# Patient Record
Sex: FEMALE | Race: WHITE | NOT HISPANIC OR LATINO | Employment: UNEMPLOYED | URBAN - METROPOLITAN AREA
[De-identification: names, ages, dates, MRNs, and addresses within clinical notes are randomized per-mention and may not be internally consistent; named-entity substitution may affect disease eponyms.]

---

## 2019-02-21 RX ORDER — MECOBAL/LEVOMEFOLAT CA/B6 PHOS 2-3-35 MG
TABLET ORAL
Qty: 30 TABLET | Refills: 0 | Status: SHIPPED | OUTPATIENT
Start: 2019-02-21 | End: 2019-03-21 | Stop reason: SDUPTHER

## 2019-02-26 RX ORDER — GABAPENTIN 300 MG/1
CAPSULE ORAL
Qty: 90 CAPSULE | Refills: 0 | Status: SHIPPED | OUTPATIENT
Start: 2019-02-26 | End: 2019-03-31 | Stop reason: SDUPTHER

## 2019-02-28 ENCOUNTER — OFFICE VISIT (OUTPATIENT)
Dept: RHEUMATOLOGY | Facility: CLINIC | Age: 74
End: 2019-02-28
Payer: MEDICARE

## 2019-02-28 VITALS
HEIGHT: 62 IN | RESPIRATION RATE: 16 BRPM | BODY MASS INDEX: 43.98 KG/M2 | DIASTOLIC BLOOD PRESSURE: 70 MMHG | TEMPERATURE: 98 F | WEIGHT: 239 LBS | HEART RATE: 76 BPM | SYSTOLIC BLOOD PRESSURE: 130 MMHG

## 2019-02-28 DIAGNOSIS — E11.42 DIABETIC PERIPHERAL NEUROPATHY: ICD-10-CM

## 2019-02-28 DIAGNOSIS — R94.4 ABNORMAL CREATININE CLEARANCE GLOMERULAR FILTRATION: ICD-10-CM

## 2019-02-28 DIAGNOSIS — M06.9 RHEUMATOID ARTHRITIS, INVOLVING UNSPECIFIED SITE, UNSPECIFIED RHEUMATOID FACTOR PRESENCE: Primary | ICD-10-CM

## 2019-02-28 DIAGNOSIS — M35.00 SJOGREN'S SYNDROME, WITH UNSPECIFIED ORGAN INVOLVEMENT: ICD-10-CM

## 2019-02-28 DIAGNOSIS — M54.17 LUMBOSACRAL RADICULOPATHY: ICD-10-CM

## 2019-02-28 DIAGNOSIS — M17.0 PRIMARY OSTEOARTHRITIS OF BOTH KNEES: ICD-10-CM

## 2019-02-28 PROBLEM — E11.9 TYPE 2 DIABETES MELLITUS: Status: ACTIVE | Noted: 2019-02-28

## 2019-02-28 PROBLEM — M70.70 BURSITIS OF HIP: Status: ACTIVE | Noted: 2019-02-28

## 2019-02-28 PROBLEM — M19.079 OSTEOARTHRITIS OF ANKLE: Status: ACTIVE | Noted: 2019-02-28

## 2019-02-28 PROBLEM — M17.9 OSTEOARTHRITIS OF KNEE: Status: ACTIVE | Noted: 2019-02-28

## 2019-02-28 PROCEDURE — 99214 PR OFFICE/OUTPT VISIT, EST, LEVL IV, 30-39 MIN: ICD-10-PCS | Mod: S$GLB,,, | Performed by: INTERNAL MEDICINE

## 2019-02-28 PROCEDURE — 99214 OFFICE O/P EST MOD 30 MIN: CPT | Mod: S$GLB,,, | Performed by: INTERNAL MEDICINE

## 2019-02-28 RX ORDER — PREDNISONE 10 MG/1
TABLET ORAL
COMMUNITY
End: 2019-09-09 | Stop reason: SDUPTHER

## 2019-02-28 RX ORDER — HYDROCODONE BITARTRATE AND ACETAMINOPHEN 7.5; 325 MG/1; MG/1
TABLET ORAL
COMMUNITY
End: 2024-02-21

## 2019-02-28 RX ORDER — PEN NEEDLE, DIABETIC 31 GX5/16"
NEEDLE, DISPOSABLE MISCELLANEOUS
Refills: 1 | COMMUNITY
Start: 2019-02-25

## 2019-02-28 RX ORDER — TEMAZEPAM 30 MG/1
CAPSULE ORAL
COMMUNITY
End: 2019-05-30 | Stop reason: SDUPTHER

## 2019-02-28 RX ORDER — PANTOPRAZOLE SODIUM 40 MG/1
TABLET, DELAYED RELEASE ORAL
COMMUNITY
Start: 2019-02-27 | End: 2023-11-15

## 2019-02-28 RX ORDER — AMLODIPINE BESYLATE 5 MG/1
TABLET ORAL
COMMUNITY
End: 2023-11-15

## 2019-02-28 RX ORDER — DULOXETIN HYDROCHLORIDE 60 MG/1
CAPSULE, DELAYED RELEASE ORAL
COMMUNITY
End: 2023-11-15

## 2019-02-28 RX ORDER — INSULIN GLARGINE 100 [IU]/ML
INJECTION, SOLUTION SUBCUTANEOUS
COMMUNITY

## 2019-02-28 RX ORDER — METHOTREXATE 2.5 MG/1
TABLET ORAL
Refills: 3 | COMMUNITY
Start: 2019-01-25 | End: 2019-12-10 | Stop reason: SDUPTHER

## 2019-02-28 RX ORDER — LEFLUNOMIDE 10 MG/1
TABLET ORAL
Refills: 4 | COMMUNITY
Start: 2019-01-31 | End: 2019-08-02 | Stop reason: SDUPTHER

## 2019-02-28 RX ORDER — LINAGLIPTIN 5 MG/1
TABLET, FILM COATED ORAL
Refills: 1 | COMMUNITY
Start: 2019-01-17

## 2019-02-28 RX ORDER — FOLIC ACID 1 MG/1
TABLET ORAL
Refills: 0 | COMMUNITY
Start: 2019-02-24 | End: 2019-05-14 | Stop reason: SDUPTHER

## 2019-02-28 RX ORDER — LOSARTAN POTASSIUM 100 MG/1
TABLET ORAL
Refills: 1 | COMMUNITY
Start: 2019-01-16 | End: 2024-02-21

## 2019-02-28 NOTE — PROGRESS NOTES
"Subjective:       Patient ID: Loly Tucker is a 73 y.o. female.    Chief Complaint: Follow-up (with labs)    HPI Comntinue on Methotrexat 7 tabs on Monday , helping arthropathy, Gabapentin 300 mg three tabs a day helping lower madai paion and sciatica pain and leflunomide 10m mg BID        Current medications include:  Current Outpatient Medications on File Prior to Visit   Medication Sig Dispense Refill    amLODIPine (NORVASC) 5 MG tablet amlodipine 5 mg tablet      BD ULTRA-FINE SHORT PEN NEEDLE 31 gauge x 5/16" Ndle USE ONCE AT BEDTIME IN CONJUNCTION WITH INSULIN PEN.  1    DULoxetine (CYMBALTA) 60 MG capsule duloxetine 60 mg capsule,delayed release   TAKE 1 CAPSULE BY MOUTH EVERY DAY IN THE EVENING      folic acid (FOLVITE) 1 MG tablet TK 1 T PO QD  0    gabapentin (NEURONTIN) 300 MG capsule TAKE ONE CAPSULE BY MOUTH THREE TIMES DAILY AFTER MEALS 90 capsule 0    HYDROcodone-acetaminophen (NORCO) 7.5-325 mg per tablet hydrocodone 7.5 mg-acetaminophen 325 mg tablet      insulin (LANTUS SOLOSTAR U-100 INSULIN) glargine 100 units/mL (3mL) SubQ pen Lantus Solostar U-100 Insulin 100 unit/mL (3 mL) subcutaneous pen   INJECT 14 UNITS SC HS      L-METHYL-B6-B12 3-35-2 mg Tab TAKE 1 TABLET BY MOUTH EVERY DAY 30 tablet 0    leflunomide (ARAVA) 10 MG Tab TK 1 T PO BID AFTER A MEAL  4    losartan (COZAAR) 100 MG tablet TK 1 T PO D  1    methotrexate 2.5 MG Tab TK 7 TS PO ONCE A WEEK ON MONDAYS.  3    multivitamin with minerals tablet Multivitamin 50 Plus tablet   Take 1 tablet every day by oral route.      pantoprazole (PROTONIX) 40 MG tablet       predniSONE (DELTASONE) 10 MG tablet prednisone 10 mg tablet      temazepam (RESTORIL) 30 mg capsule temazepam 30 mg capsule      TRADJENTA 5 mg Tab tablet TK 1 T PO D  1     No current facility-administered medications on file prior to visit.        Lab Results:  No results found for: WBC, RBC, HGB, HCT, MCV, COLORU, SPECGRAV, PHUR, WBCUR, NITRITE, GLUCOSEUR, " "KETONESU, BILIRUBINUR, RBCUR, NA, K, CL, CO2, GLU, BUN, CREATININE     Review of Systems      Objective:   /70 (BP Location: Right arm, Patient Position: Sitting, BP Method: Large (Manual))   Pulse 76   Temp 97.5 °F (36.4 °C) (Temporal)   Resp 16   Ht 5' 2" (1.575 m)   Wt 108.4 kg (239 lb)   BMI 43.71 kg/m²      Physical Exam      Assessment:       1. Primary osteoarthritis of both knees    2. Rheumatoid arthritis, involving unspecified site, unspecified rheumatoid factor presence    3. Sjogren's syndrome, with unspecified organ involvement    4. Abnormal creatinine clearance glomerular filtration    5. Diabetic peripheral neuropathy    6. Lumbosacral radiculopathy            Plan:       Continue with current medication at same dosis    "

## 2019-03-21 RX ORDER — MECOBAL/LEVOMEFOLAT CA/B6 PHOS 2-3-35 MG
TABLET ORAL
Qty: 30 TABLET | Refills: 0 | Status: SHIPPED | OUTPATIENT
Start: 2019-03-21 | End: 2019-04-18 | Stop reason: SDUPTHER

## 2019-04-01 RX ORDER — GABAPENTIN 300 MG/1
CAPSULE ORAL
Qty: 90 CAPSULE | Refills: 3 | Status: SHIPPED | OUTPATIENT
Start: 2019-04-01 | End: 2019-06-20

## 2019-04-19 RX ORDER — MECOBAL/LEVOMEFOLAT CA/B6 PHOS 2-3-35 MG
TABLET ORAL
Qty: 30 TABLET | Refills: 0 | Status: SHIPPED | OUTPATIENT
Start: 2019-04-19 | End: 2019-05-21 | Stop reason: SDUPTHER

## 2019-05-14 RX ORDER — FOLIC ACID 1 MG/1
TABLET ORAL
Qty: 90 TABLET | Refills: 2 | Status: SHIPPED | OUTPATIENT
Start: 2019-05-14 | End: 2020-08-04 | Stop reason: SDUPTHER

## 2019-05-21 RX ORDER — MECOBAL/LEVOMEFOLAT CA/B6 PHOS 2-3-35 MG
TABLET ORAL
Qty: 30 TABLET | Refills: 0 | Status: SHIPPED | OUTPATIENT
Start: 2019-05-21 | End: 2019-06-20 | Stop reason: SDUPTHER

## 2019-05-30 ENCOUNTER — TELEPHONE (OUTPATIENT)
Dept: RHEUMATOLOGY | Facility: CLINIC | Age: 74
End: 2019-05-30

## 2019-05-30 DIAGNOSIS — G47.00 INSOMNIA, UNSPECIFIED TYPE: Primary | ICD-10-CM

## 2019-05-30 RX ORDER — TEMAZEPAM 30 MG/1
CAPSULE ORAL
Qty: 30 CAPSULE | Refills: 2 | Status: SHIPPED | OUTPATIENT
Start: 2019-05-30 | End: 2019-10-21 | Stop reason: SDUPTHER

## 2019-06-12 LAB
ABS NRBC COUNT: 0 X 10 3/UL (ref 0–0.01)
ABSOLUTE BASOPHIL: 0.05 X 10 3/UL (ref 0–0.22)
ABSOLUTE EOSINOPHIL: 0.16 X 10 3/UL (ref 0.04–0.54)
ABSOLUTE IMMATURE GRAN: 0.03 X 10 3/UL (ref 0–0.04)
ABSOLUTE LYMPHOCYTE: 1.29 X 10 3/UL (ref 0.86–4.75)
ABSOLUTE MONOCYTE: 0.53 X 10 3/UL (ref 0.22–1.08)
ALBUMIN SERPL-MCNC: 3.8 G/DL (ref 3.5–5.2)
ALBUMIN/GLOB SERPL ELPH: 1.2 {RATIO} (ref 1–2.7)
ALP ISOS SERPL LEV INH-CCNC: 100 IU/L (ref 35–105)
ALT (SGPT): 23 U/L (ref 0–33)
AMORPH URATE CRY URNS QL MICRO: NEGATIVE
ANION GAP SERPL CALC-SCNC: 10 MMOL/L (ref 8–17)
AST SERPL-CCNC: 33 U/L (ref 0–32)
BACTERIA #/AREA URNS HPF: ABNORMAL /[HPF]
BASOPHILS NFR BLD: 0.6 %
BILIRUB UR QL STRIP: NEGATIVE
BILIRUBIN, TOTAL: 0.56 MG/DL (ref 0–1.2)
BUN/CREAT SERPL: 18.9 (ref 6–20)
CALCIUM SERPL-MCNC: 9.3 MG/DL (ref 8.6–10.2)
CARBON DIOXIDE, CO2: 29 MMOL/L (ref 22–29)
CHLORIDE: 101 MMOL/L (ref 98–107)
CLARITY UR: CLEAR
COLOR UR: YELLOW
CREAT SERPL-MCNC: 1.31 MG/DL (ref 0.5–0.9)
EOSINOPHIL NFR BLD: 2 %
EPITHELIAL CELLS: ABNORMAL
GFR ESTIMATION: 39.58
GLOBULIN: 3.2 G/DL (ref 1.5–4.5)
GLUCOSE (UA): NEGATIVE MG/DL
GLUCOSE: 118 MG/DL (ref 82–115)
HCT VFR BLD AUTO: 42.1 % (ref 37–47)
HGB BLD-MCNC: 12.9 G/DL (ref 12–16)
HYALINE CASTS #/AREA URNS LPF: ABNORMAL /[LPF]
IMMATURE GRANULOCYTES: 0.4 % (ref 0–0.5)
KETONES UR QL STRIP: NEGATIVE MG/DL
LEUKOCYTE ESTERASE UR QL STRIP: ABNORMAL
LYMPHOCYTES NFR BLD: 16.3 %
MCH RBC QN AUTO: 29.9 PG (ref 27–32)
MCHC RBC AUTO-ENTMCNC: 30.6 G/DL (ref 32–36)
MCV RBC AUTO: 97.7 FL (ref 82–100)
MONOCYTES NFR BLD: 6.7 %
MUCOUS THREADS URNS QL MICRO: ABNORMAL
NEUTROPHILS ABSOLUTE COUNT: 5.87 X 10 3/UL (ref 2.15–7.56)
NEUTROPHILS NFR BLD: 74 %
NITRITE UR QL STRIP: NEGATIVE
NUCLEATED RED BLOOD CELLS: 0 /100 WBC (ref 0–0.2)
OCCULT BLOOD: NEGATIVE
PH, URINE: 6 (ref 5–7.5)
PLATELET # BLD AUTO: 299 X 10 3/UL (ref 135–400)
POTASSIUM: 4.9 MMOL/L (ref 3.5–5.1)
PROT SNV-MCNC: 7 G/DL (ref 6.4–8.3)
PROT UR QL STRIP: NEGATIVE MG/DL
RBC # BLD AUTO: 4.31 X 10 6/UL (ref 4.2–5.4)
RBC/HPF: NEGATIVE
RDW-SD: 50.9 FL (ref 37–54)
SED RATE (WESTERGREN): 34 MM/HR (ref 0–30)
SODIUM: 140 MMOL/L (ref 136–145)
SP GR UR STRIP: 1.01 (ref 1–1.03)
UREA NITROGEN (BUN): 24.8 MG/DL (ref 8–23)
UROBILINOGEN, URINE: NORMAL E.U./DL (ref 0–1)
WBC # BLD: 7.93 X 10 3/UL (ref 4.3–10.8)
WBC/HPF: ABNORMAL

## 2019-06-20 ENCOUNTER — OFFICE VISIT (OUTPATIENT)
Dept: RHEUMATOLOGY | Facility: CLINIC | Age: 74
End: 2019-06-20
Payer: MEDICARE

## 2019-06-20 VITALS
HEART RATE: 93 BPM | TEMPERATURE: 96 F | RESPIRATION RATE: 16 BRPM | HEIGHT: 62 IN | BODY MASS INDEX: 43.61 KG/M2 | DIASTOLIC BLOOD PRESSURE: 80 MMHG | WEIGHT: 237 LBS | SYSTOLIC BLOOD PRESSURE: 130 MMHG

## 2019-06-20 DIAGNOSIS — R94.4 ABNORMAL CREATININE CLEARANCE GLOMERULAR FILTRATION: Primary | ICD-10-CM

## 2019-06-20 DIAGNOSIS — M25.552 PAIN OF BOTH HIP JOINTS: ICD-10-CM

## 2019-06-20 DIAGNOSIS — M54.17 LUMBOSACRAL RADICULOPATHY: ICD-10-CM

## 2019-06-20 DIAGNOSIS — M05.79 RHEUMATOID ARTHRITIS INVOLVING MULTIPLE SITES WITH POSITIVE RHEUMATOID FACTOR: ICD-10-CM

## 2019-06-20 DIAGNOSIS — M25.551 PAIN OF BOTH HIP JOINTS: ICD-10-CM

## 2019-06-20 DIAGNOSIS — M19.079 OSTEOARTHRITIS OF ANKLE, UNSPECIFIED LATERALITY, UNSPECIFIED OSTEOARTHRITIS TYPE: ICD-10-CM

## 2019-06-20 DIAGNOSIS — E11.42 DIABETIC PERIPHERAL NEUROPATHY: ICD-10-CM

## 2019-06-20 DIAGNOSIS — M35.00 SJOGREN'S SYNDROME, WITH UNSPECIFIED ORGAN INVOLVEMENT: ICD-10-CM

## 2019-06-20 DIAGNOSIS — M17.0 PRIMARY OSTEOARTHRITIS OF BOTH KNEES: ICD-10-CM

## 2019-06-20 DIAGNOSIS — M06.9 RHEUMATOID ARTHRITIS, INVOLVING UNSPECIFIED SITE, UNSPECIFIED RHEUMATOID FACTOR PRESENCE: ICD-10-CM

## 2019-06-20 DIAGNOSIS — Z96.653 STATUS POST TOTAL BILATERAL KNEE REPLACEMENT: ICD-10-CM

## 2019-06-20 PROBLEM — Z96.659 STATUS POST TOTAL KNEE REPLACEMENT: Status: ACTIVE | Noted: 2019-06-20

## 2019-06-20 PROBLEM — M25.559 ARTHRALGIA OF HIP: Status: ACTIVE | Noted: 2017-10-02

## 2019-06-20 PROBLEM — M54.16 LUMBAR RADICULAR PAIN: Status: ACTIVE | Noted: 2017-10-02

## 2019-06-20 PROBLEM — M47.817 LUMBOSACRAL SPONDYLOSIS: Status: ACTIVE | Noted: 2019-06-20

## 2019-06-20 PROBLEM — M48.061 LUMBAR STENOSIS: Status: ACTIVE | Noted: 2017-10-02

## 2019-06-20 PROCEDURE — 99214 OFFICE O/P EST MOD 30 MIN: CPT | Mod: S$GLB,,, | Performed by: INTERNAL MEDICINE

## 2019-06-20 PROCEDURE — 99214 PR OFFICE/OUTPT VISIT, EST, LEVL IV, 30-39 MIN: ICD-10-PCS | Mod: S$GLB,,, | Performed by: INTERNAL MEDICINE

## 2019-06-20 NOTE — PROGRESS NOTES
"Subjective:       Patient ID: Loly Tucker is a 73 y.o. female.    Chief Complaint: Follow-up (with labs)    HPI lower back giving her pain associated with sciatica pain- pain in both hips worse in rt. Suffering with pain in hands  And both wrist  But not as sever level 2 but rredness in forehannd but improve redness today Gabapentin 300 mg 2 at hs and 1 in am.. Continue with 7 methotrexate and 10 mg leflunomide 20 mg a day.         Current medications include:  Current Outpatient Medications on File Prior to Visit   Medication Sig Dispense Refill    amLODIPine (NORVASC) 5 MG tablet amlodipine 5 mg tablet      BD ULTRA-FINE SHORT PEN NEEDLE 31 gauge x 5/16" Ndle USE ONCE AT BEDTIME IN CONJUNCTION WITH INSULIN PEN.  1    DULoxetine (CYMBALTA) 60 MG capsule duloxetine 60 mg capsule,delayed release   TAKE 1 CAPSULE BY MOUTH EVERY DAY IN THE EVENING      folic acid (FOLVITE) 1 MG tablet TAKE 1 TABLET BY MOUTH EVERY DAY 90 tablet 2    gabapentin (NEURONTIN) 300 MG capsule TAKE ONE CAPSULE BY MOUTH THREE TIMES DAILY AFTER MEALS 90 capsule 3    HYDROcodone-acetaminophen (NORCO) 7.5-325 mg per tablet hydrocodone 7.5 mg-acetaminophen 325 mg tablet      insulin (LANTUS SOLOSTAR U-100 INSULIN) glargine 100 units/mL (3mL) SubQ pen Lantus Solostar U-100 Insulin 100 unit/mL (3 mL) subcutaneous pen   INJECT 14 UNITS SC HS      leflunomide (ARAVA) 10 MG Tab TK 1 T PO BID AFTER A MEAL  4    losartan (COZAAR) 100 MG tablet TK 1 T PO D  1    methotrexate 2.5 MG Tab TK 7 TS PO ONCE A WEEK ON MONDAYS.  3    multivitamin with minerals tablet Multivitamin 50 Plus tablet   Take 1 tablet every day by oral route.      pantoprazole (PROTONIX) 40 MG tablet       predniSONE (DELTASONE) 10 MG tablet prednisone 10 mg tablet      temazepam (RESTORIL) 30 mg capsule Take 1 tablet once daily at bedtime 30 capsule 2    TRADJENTA 5 mg Tab tablet TK 1 T PO D  1    [DISCONTINUED] L-METHYL-B6-B12 3-35-2 mg Tab TAKE 1 TABLET BY MOUTH " "EVERY DAY 30 tablet 0     No current facility-administered medications on file prior to visit.        Lab Results:  WBC   Date Value Ref Range Status   06/12/2019 7.93 4.3 - 10.8 X 10 3/ul Final     Hemoglobin   Date Value Ref Range Status   06/12/2019 12.9 12 - 16 g/dL Final     Hematocrit   Date Value Ref Range Status   06/12/2019 42.1 37 - 47 % Final     Color, UA   Date Value Ref Range Status   06/12/2019 YELLOW  Final     Ketones, UA   Date Value Ref Range Status   06/12/2019 NEGATIVE NEGATIVE mg/dL Final     Sodium   Date Value Ref Range Status   06/12/2019 140 136 - 145 mmol/L Final     Potassium   Date Value Ref Range Status   06/12/2019 4.9 3.5 - 5.1 mmol/L Final     Chloride   Date Value Ref Range Status   06/12/2019 101 98 - 107 mmol/L Final     CO2   Date Value Ref Range Status   06/12/2019 29 22 - 29 mmol/L Final     BUN, Bld   Date Value Ref Range Status   06/12/2019 24.8 (H) 8 - 23 mg/dL Final     Creatinine   Date Value Ref Range Status   06/12/2019 1.31 (H) 0.50 - 0.90 mg/dL Final     Comment:     RECOMMEND REPEAT CREATININE WITHIN 90 DAYS        Review of Systems   Constitutional: Negative.    HENT:        Dryness in eyes hel;ped by Systane and sips more water   Eyes: Negative.    Respiratory: Negative.    Cardiovascular: Negative.    Gastrointestinal: Negative.    Endocrine: Cold intolerance: DM on insulin and trajenta.        DFM   Genitourinary: Negative.    Musculoskeletal: Positive for arthralgias and back pain.   Allergic/Immunologic: Positive for environmental allergies.   Neurological: Positive for numbness.        Legs and feet numbness   Hematological: Negative.    Psychiatric/Behavioral: Negative.          Objective:   /80 (BP Location: Left arm, Patient Position: Sitting, BP Method: Large (Manual))   Pulse 93   Temp 96.3 °F (35.7 °C) (Temporal)   Resp 16   Ht 5' 2" (1.575 m)   Wt 107.5 kg (237 lb)   BMI 43.35 kg/m²      Physical Exam   Constitutional: She is well-developed, " well-nourished, and in no distress.   HENT:   Dryness in eyes and mouth   Eyes: Conjunctivae and EOM are normal. Pupils are equal, round, and reactive to light.   Neck: Normal range of motion. Neck supple.   Cardiovascular: Normal rate, regular rhythm and normal heart sounds.    Pulmonary/Chest: Effort normal and breath sounds normal.   Abdominal: Soft. Bowel sounds are normal.   Neurological:   SLR =positive   Skin: Skin is warm and dry.     Musculoskeletal:   Tender ankle and LS spine           Assessment:       1. Abnormal creatinine clearance glomerular filtration    2. Rheumatoid arthritis involving multiple sites with positive rheumatoid factor    3. Status post total bilateral knee replacement    4. Pain of both hip joints    5. Rheumatoid arthritis, involving unspecified site, unspecified rheumatoid factor presence    6. Osteoarthritis of ankle, unspecified laterality, unspecified osteoarthritis type    7. Primary osteoarthritis of both knees    8. Sjogren's syndrome, with unspecified organ involvement    9. Diabetic peripheral neuropathy    10. Lumbosacral radiculopathy            Plan:        Will increase Gabapentin 4 a day

## 2019-08-05 RX ORDER — GABAPENTIN 300 MG/1
CAPSULE ORAL
Qty: 90 CAPSULE | Refills: 3 | Status: SHIPPED | OUTPATIENT
Start: 2019-08-05 | End: 2019-11-28 | Stop reason: SDUPTHER

## 2019-08-05 RX ORDER — LEFLUNOMIDE 10 MG/1
TABLET ORAL
Qty: 180 TABLET | Refills: 3 | Status: SHIPPED | OUTPATIENT
Start: 2019-08-05 | End: 2020-07-22 | Stop reason: SDUPTHER

## 2019-09-09 RX ORDER — PREDNISONE 10 MG/1
TABLET ORAL
Qty: 15 TABLET | Refills: 4 | Status: SHIPPED | OUTPATIENT
Start: 2019-09-09 | End: 2020-08-04 | Stop reason: SDUPTHER

## 2019-10-14 RX ORDER — MECOBAL/LEVOMEFOLAT CA/B6 PHOS 2-3-35 MG
TABLET ORAL
Qty: 30 TABLET | Refills: 3 | Status: SHIPPED | OUTPATIENT
Start: 2019-10-14 | End: 2020-02-21

## 2019-10-21 ENCOUNTER — OFFICE VISIT (OUTPATIENT)
Dept: RHEUMATOLOGY | Facility: CLINIC | Age: 74
End: 2019-10-21
Payer: MEDICARE

## 2019-10-21 VITALS
SYSTOLIC BLOOD PRESSURE: 140 MMHG | DIASTOLIC BLOOD PRESSURE: 80 MMHG | OXYGEN SATURATION: 97 % | HEART RATE: 88 BPM | HEIGHT: 62 IN | RESPIRATION RATE: 16 BRPM | TEMPERATURE: 97 F | WEIGHT: 238 LBS | BODY MASS INDEX: 43.79 KG/M2

## 2019-10-21 DIAGNOSIS — M25.552 PAIN OF BOTH HIP JOINTS: ICD-10-CM

## 2019-10-21 DIAGNOSIS — E11.42 DIABETIC PERIPHERAL NEUROPATHY: ICD-10-CM

## 2019-10-21 DIAGNOSIS — M06.9 RHEUMATOID ARTHRITIS, INVOLVING UNSPECIFIED SITE, UNSPECIFIED RHEUMATOID FACTOR PRESENCE: Primary | ICD-10-CM

## 2019-10-21 DIAGNOSIS — G47.00 INSOMNIA, UNSPECIFIED TYPE: ICD-10-CM

## 2019-10-21 DIAGNOSIS — M35.00 SJOGREN'S SYNDROME, WITH UNSPECIFIED ORGAN INVOLVEMENT: ICD-10-CM

## 2019-10-21 DIAGNOSIS — M54.17 LUMBOSACRAL RADICULOPATHY: ICD-10-CM

## 2019-10-21 DIAGNOSIS — R94.4 ABNORMAL CREATININE CLEARANCE GLOMERULAR FILTRATION: ICD-10-CM

## 2019-10-21 DIAGNOSIS — M25.551 PAIN OF BOTH HIP JOINTS: ICD-10-CM

## 2019-10-21 DIAGNOSIS — Z96.653 STATUS POST TOTAL BILATERAL KNEE REPLACEMENT: ICD-10-CM

## 2019-10-21 DIAGNOSIS — M70.60 TROCHANTERIC BURSITIS, UNSPECIFIED LATERALITY: ICD-10-CM

## 2019-10-21 DIAGNOSIS — M17.0 PRIMARY OSTEOARTHRITIS OF BOTH KNEES: ICD-10-CM

## 2019-10-21 DIAGNOSIS — M48.061 SPINAL STENOSIS OF LUMBAR REGION, UNSPECIFIED WHETHER NEUROGENIC CLAUDICATION PRESENT: ICD-10-CM

## 2019-10-21 PROCEDURE — 99214 OFFICE O/P EST MOD 30 MIN: CPT | Mod: S$GLB,,, | Performed by: INTERNAL MEDICINE

## 2019-10-21 PROCEDURE — 99214 PR OFFICE/OUTPT VISIT, EST, LEVL IV, 30-39 MIN: ICD-10-PCS | Mod: S$GLB,,, | Performed by: INTERNAL MEDICINE

## 2019-10-21 RX ORDER — CHOLECALCIFEROL (VITAMIN D3) 25 MCG
TABLET ORAL
COMMUNITY
End: 2023-11-15

## 2019-10-21 RX ORDER — LEVOCETIRIZINE DIHYDROCHLORIDE 5 MG/1
TABLET, FILM COATED ORAL
Refills: 1 | COMMUNITY
Start: 2019-09-30 | End: 2023-11-15

## 2019-10-21 RX ORDER — TEMAZEPAM 30 MG/1
CAPSULE ORAL
Qty: 30 CAPSULE | Refills: 4 | Status: SHIPPED | OUTPATIENT
Start: 2019-10-21 | End: 2020-08-04 | Stop reason: SDUPTHER

## 2019-10-21 NOTE — PROGRESS NOTES
"Subjective:       Patient ID: Loly Tucker is a 74 y.o. female.    Chief Complaint: Follow-up (with lab results)    HPI  Left 2 nd mcp and pip with swelling and tenderness  Occasionally. Taking Methotrexate 7 tabs once a week, Leflunomide 10 mg BID abnd folic acid 2 mg daily. Has low back and sciatica pain occasionally  On gabapentin 300 mg 1and 2 caps help some for low back.. Bilateral TKR not symptomatic. Dryness in eyes helped by systane drops and the gel at night . Peripheral neuropathy burning inn legs and feet..        Current medications include:  Current Outpatient Medications on File Prior to Visit   Medication Sig Dispense Refill    amLODIPine (NORVASC) 5 MG tablet amlodipine 5 mg tablet      BD ULTRA-FINE SHORT PEN NEEDLE 31 gauge x 5/16" Ndle USE ONCE AT BEDTIME IN CONJUNCTION WITH INSULIN PEN.  1    DULoxetine (CYMBALTA) 60 MG capsule duloxetine 60 mg capsule,delayed release   TAKE 1 CAPSULE BY MOUTH EVERY DAY IN THE EVENING      folic acid (FOLVITE) 1 MG tablet TAKE 1 TABLET BY MOUTH EVERY DAY 90 tablet 2    gabapentin (NEURONTIN) 300 MG capsule TAKE ONE CAPSULE BY MOUTH THREE TIMES DAILY AFTER MEALS 90 capsule 3    HYDROcodone-acetaminophen (NORCO) 7.5-325 mg per tablet hydrocodone 7.5 mg-acetaminophen 325 mg tablet      insulin (LANTUS SOLOSTAR U-100 INSULIN) glargine 100 units/mL (3mL) SubQ pen Lantus Solostar U-100 Insulin 100 unit/mL (3 mL) subcutaneous pen   INJECT 14 UNITS SC HS      L-METHYL-B6-B12 3-35-2 mg Tab TAKE 1 TABLET BY MOUTH EVERY DAY 30 tablet 3    leflunomide (ARAVA) 10 MG Tab TAKE 1 TABLET BY MOUTH TWICE DAILY AFTER A MEAL 180 tablet 3    levocetirizine (XYZAL) 5 MG tablet TK 1 T PO HS  1    losartan (COZAAR) 100 MG tablet TK 1 T PO D  1    methotrexate 2.5 MG Tab TK 7 TS PO ONCE A WEEK ON MONDAYS.  3    multivitamin with minerals tablet Multivitamin 50 Plus tablet   Take 1 tablet every day by oral route.      pantoprazole (PROTONIX) 40 MG tablet       " "predniSONE (DELTASONE) 10 MG tablet TAKE 1 TABLET THREE TIMES DAILY BY MOUTH FOR 3 DAYS, THEN 1 TABLET TWICE DAILY FOR 2 DAYS, THEN 1 TABLET A DAY FOR 2 DAYS. 15 tablet 4    temazepam (RESTORIL) 30 mg capsule Take 1 tablet once daily at bedtime 30 capsule 2    TRADJENTA 5 mg Tab tablet TK 1 T PO D  1    vitamin D (VITAMIN D3) 1000 units Tab Vitamin D3 25 mcg (1,000 unit) tablet   Take 1 tablet every day by oral route.       No current facility-administered medications on file prior to visit.        Lab Results:  WBC   Date Value Ref Range Status   06/12/2019 7.93 4.3 - 10.8 X 10 3/ul Final     Hemoglobin   Date Value Ref Range Status   06/12/2019 12.9 12 - 16 g/dL Final     Hematocrit   Date Value Ref Range Status   06/12/2019 42.1 37 - 47 % Final     Color, UA   Date Value Ref Range Status   06/12/2019 YELLOW  Final     Ketones, UA   Date Value Ref Range Status   06/12/2019 NEGATIVE NEGATIVE mg/dL Final     Sodium   Date Value Ref Range Status   06/12/2019 140 136 - 145 mmol/L Final     Potassium   Date Value Ref Range Status   06/12/2019 4.9 3.5 - 5.1 mmol/L Final     Chloride   Date Value Ref Range Status   06/12/2019 101 98 - 107 mmol/L Final     CO2   Date Value Ref Range Status   06/12/2019 29 22 - 29 mmol/L Final     BUN, Bld   Date Value Ref Range Status   06/12/2019 24.8 (H) 8 - 23 mg/dL Final     Creatinine   Date Value Ref Range Status   06/12/2019 1.31 (H) 0.50 - 0.90 mg/dL Final     Comment:     RECOMMEND REPEAT CREATININE WITHIN 90 DAYS        Review of Systems      Objective:   BP (!) 140/80 (BP Location: Right arm, Patient Position: Sitting, BP Method: Large (Manual))   Pulse 88   Temp 96.8 °F (36 °C) (Temporal)   Resp 16   Ht 5' 2" (1.575 m)   Wt 108 kg (238 lb)   SpO2 97%   BMI 43.53 kg/m²      Physical Exam   Constitutional: She is well-developed, well-nourished, and in no distress.   HENT:   Head: Normocephalic and atraumatic.   Eyes: Conjunctivae and EOM are normal. Pupils are equal, " round, and reactive to light.   Neck: Normal range of motion. Neck supple.   Cardiovascular: Normal rate, regular rhythm and intact distal pulses.    Pulmonary/Chest: Breath sounds normal.   Abdominal: Bowel sounds are normal.   Neurological:   SLR =positive rt side   Skin: Skin is warm and dry.     Psychiatric: Mood, memory, affect and judgment normal.   Musculoskeletal:   Bilateral shoulder neck trigger points, bilateral hip grater trochanter bursitis. Left ankle tenderness.           Assessment:       1. Rheumatoid arthritis, involving unspecified site, unspecified rheumatoid factor presence    2. Insomnia, unspecified type    3. Sjogren's syndrome, with unspecified organ involvement    4. Diabetic peripheral neuropathy    5. Lumbosacral radiculopathy    6. Spinal stenosis of lumbar region, unspecified whether neurogenic claudication present    7. Abnormal creatinine clearance glomerular filtration    8. Pain of both hip joints    9. Trochanteric bursitis, unspecified laterality    10. Primary osteoarthritis of both knees    11. Status post total bilateral knee replacement            Plan:       Will ad Gabapentin 300 mg at noon time for better control of Peripheral neuropathy and radiculopathy.  GFR =349.5

## 2019-12-09 RX ORDER — GABAPENTIN 300 MG/1
CAPSULE ORAL
Qty: 90 CAPSULE | Refills: 3 | Status: SHIPPED | OUTPATIENT
Start: 2019-12-09 | End: 2020-03-24

## 2019-12-10 RX ORDER — METHOTREXATE 2.5 MG/1
TABLET ORAL
Qty: 84 TABLET | Refills: 2 | Status: SHIPPED | OUTPATIENT
Start: 2019-12-10 | End: 2020-08-04 | Stop reason: SDUPTHER

## 2020-02-21 ENCOUNTER — OFFICE VISIT (OUTPATIENT)
Dept: RHEUMATOLOGY | Facility: CLINIC | Age: 75
End: 2020-02-21
Payer: MEDICARE

## 2020-02-21 VITALS
RESPIRATION RATE: 16 BRPM | WEIGHT: 234 LBS | DIASTOLIC BLOOD PRESSURE: 56 MMHG | SYSTOLIC BLOOD PRESSURE: 125 MMHG | TEMPERATURE: 98 F | HEART RATE: 84 BPM | BODY MASS INDEX: 43.06 KG/M2 | OXYGEN SATURATION: 98 % | HEIGHT: 62 IN

## 2020-02-21 DIAGNOSIS — Z96.653 STATUS POST TOTAL BILATERAL KNEE REPLACEMENT: ICD-10-CM

## 2020-02-21 DIAGNOSIS — Z79.899 HISTORY OF ONGOING TREATMENT WITH HIGH-RISK MEDICATION: ICD-10-CM

## 2020-02-21 DIAGNOSIS — M54.17 LUMBOSACRAL RADICULOPATHY: ICD-10-CM

## 2020-02-21 DIAGNOSIS — M35.00 SJOGREN'S SYNDROME, WITH UNSPECIFIED ORGAN INVOLVEMENT: ICD-10-CM

## 2020-02-21 DIAGNOSIS — E11.42 DIABETIC PERIPHERAL NEUROPATHY: ICD-10-CM

## 2020-02-21 DIAGNOSIS — M47.27 OSTEOARTHRITIS OF SPINE WITH RADICULOPATHY, LUMBOSACRAL REGION: ICD-10-CM

## 2020-02-21 DIAGNOSIS — R94.4 ABNORMAL CREATININE CLEARANCE GLOMERULAR FILTRATION: ICD-10-CM

## 2020-02-21 DIAGNOSIS — M70.60 TROCHANTERIC BURSITIS, UNSPECIFIED LATERALITY: ICD-10-CM

## 2020-02-21 DIAGNOSIS — M05.79 RHEUMATOID ARTHRITIS INVOLVING MULTIPLE SITES WITH POSITIVE RHEUMATOID FACTOR: Primary | ICD-10-CM

## 2020-02-21 PROCEDURE — 99214 PR OFFICE/OUTPT VISIT, EST, LEVL IV, 30-39 MIN: ICD-10-PCS | Mod: S$GLB,,, | Performed by: INTERNAL MEDICINE

## 2020-02-21 PROCEDURE — 99214 OFFICE O/P EST MOD 30 MIN: CPT | Mod: S$GLB,,, | Performed by: INTERNAL MEDICINE

## 2020-02-21 RX ORDER — MECOBAL/LEVOMEFOLAT CA/B6 PHOS 2-3-35 MG
TABLET ORAL
Qty: 30 TABLET | Refills: 3 | Status: SHIPPED | OUTPATIENT
Start: 2020-02-21 | End: 2020-06-15

## 2020-02-21 NOTE — PROGRESS NOTES
"Subjective:       Patient ID: Loly Tucker is a 74 y.o. female.    Chief Complaint: Follow-up (with lab results)    HPI Continue on methotrexate 2.5 mg tabs 7 tabs a day and leflunomide 20 mg a day with left wrist pain with no pom. In October wit 6 injectio 4  In lower back and  2 in the hip  By DR melendez  Pain management  With reliefm until recently and upper lower back mnot as severe as before. . Has sorenes in both hands and feet. Rt first toe with numbness.Dryness in eyes helped by   Systane and mild drynessin the mouth      Current medications include:  Current Outpatient Medications on File Prior to Visit   Medication Sig Dispense Refill    amLODIPine (NORVASC) 5 MG tablet amlodipine 5 mg tablet      BD ULTRA-FINE SHORT PEN NEEDLE 31 gauge x 5/16" Ndle USE ONCE AT BEDTIME IN CONJUNCTION WITH INSULIN PEN.  1    DULoxetine (CYMBALTA) 60 MG capsule duloxetine 60 mg capsule,delayed release   TAKE 1 CAPSULE BY MOUTH EVERY DAY IN THE EVENING      folic acid (FOLVITE) 1 MG tablet TAKE 1 TABLET BY MOUTH EVERY DAY 90 tablet 2    gabapentin (NEURONTIN) 300 MG capsule TAKE ONE CAPSULE BY MOUTH THREE TIMES DAILY AFTER MEALS 90 capsule 3    HYDROcodone-acetaminophen (NORCO) 7.5-325 mg per tablet hydrocodone 7.5 mg-acetaminophen 325 mg tablet      insulin (LANTUS SOLOSTAR U-100 INSULIN) glargine 100 units/mL (3mL) SubQ pen Lantus Solostar U-100 Insulin 100 unit/mL (3 mL) subcutaneous pen   INJECT 14 UNITS SC HS      L-METHYL-B6-B12 3-35-2 mg Tab TAKE 1 TABLET BY MOUTH EVERY DAY 30 tablet 3    leflunomide (ARAVA) 10 MG Tab TAKE 1 TABLET BY MOUTH TWICE DAILY AFTER A MEAL 180 tablet 3    levocetirizine (XYZAL) 5 MG tablet TK 1 T PO HS  1    losartan (COZAAR) 100 MG tablet TK 1 T PO D  1    methotrexate 2.5 MG Tab TAKE 7 TABLETS BY MOUTH ONCE A WEEK ON MONDAYS. 84 tablet 2    multivitamin with minerals tablet Multivitamin 50 Plus tablet   Take 1 tablet every day by oral route.      pantoprazole (PROTONIX) 40 MG " tablet       predniSONE (DELTASONE) 10 MG tablet TAKE 1 TABLET THREE TIMES DAILY BY MOUTH FOR 3 DAYS, THEN 1 TABLET TWICE DAILY FOR 2 DAYS, THEN 1 TABLET A DAY FOR 2 DAYS. 15 tablet 4    temazepam (RESTORIL) 30 mg capsule Take 1 tablet once daily at bedtime 30 capsule 4    TRADJENTA 5 mg Tab tablet TK 1 T PO D  1    vitamin D (VITAMIN D3) 1000 units Tab Vitamin D3 25 mcg (1,000 unit) tablet   Take 1 tablet every day by oral route.      [DISCONTINUED] L-METHYL-B6-B12 3-35-2 mg Tab TAKE 1 TABLET BY MOUTH EVERY DAY 30 tablet 3     No current facility-administered medications on file prior to visit.        Lab Results:  WBC   Date Value Ref Range Status   06/12/2019 7.93 4.3 - 10.8 X 10 3/ul Final     Hemoglobin   Date Value Ref Range Status   06/12/2019 12.9 12 - 16 g/dL Final     Hematocrit   Date Value Ref Range Status   06/12/2019 42.1 37 - 47 % Final     Color, UA   Date Value Ref Range Status   06/12/2019 YELLOW  Final     Ketones, UA   Date Value Ref Range Status   06/12/2019 NEGATIVE NEGATIVE mg/dL Final     Sodium   Date Value Ref Range Status   06/12/2019 140 136 - 145 mmol/L Final     Potassium   Date Value Ref Range Status   06/12/2019 4.9 3.5 - 5.1 mmol/L Final     Chloride   Date Value Ref Range Status   06/12/2019 101 98 - 107 mmol/L Final     CO2   Date Value Ref Range Status   06/12/2019 29 22 - 29 mmol/L Final     BUN, Bld   Date Value Ref Range Status   06/12/2019 24.8 (H) 8 - 23 mg/dL Final     Creatinine   Date Value Ref Range Status   06/12/2019 1.31 (H) 0.50 - 0.90 mg/dL Final     Comment:     RECOMMEND REPEAT CREATININE WITHIN 90 DAYS        Review of Systems   Constitutional: Negative.    HENT:        Drty eyes and dry mouth   Eyes: Negative.    Respiratory: Positive for cough.    Cardiovascular: Negative.    Gastrointestinal: Negative.    Endocrine:        AODM type 2    Genitourinary: Positive for frequency.   Musculoskeletal: Positive for arthralgias and back pain.  "  Allergic/Immunologic: Positive for environmental allergies.   Neurological:        Nuimbness in feet and hands   Hematological: Negative.    Psychiatric/Behavioral: Negative.          Objective:   BP (!) 125/56 (BP Location: Left arm, Patient Position: Sitting, BP Method: Large (Automatic))   Pulse 84   Temp 98.1 °F (36.7 °C) (Tympanic)   Resp 16   Ht 5' 2" (1.575 m)   Wt 106.1 kg (234 lb)   SpO2 98%   BMI 42.80 kg/m²      Physical Exam   Constitutional: She is well-developed, well-nourished, and in no distress.   HENT:   Head: Normocephalic and atraumatic.   No drynessin the eyes and mouth   Eyes: Conjunctivae and EOM are normal. Pupils are equal, round, and reactive to light.   Neck: Normal range of motion. Neck supple.   Cardiovascular: Normal rate and regular rhythm.    Pulmonary/Chest: Effort normal and breath sounds normal.   Abdominal: Soft. Bowel sounds are normal.   Neurological:   SDLR=negative   Skin: Skin is warm and dry.     Musculoskeletal:   No suynovial thickening or tenderness , tender in LS spine . Ankles and knees normal with pom of the hips            Assessment:       1. Rheumatoid arthritis involving multiple sites with positive rheumatoid factor    2. Osteoarthritis of spine with radiculopathy, lumbosacral region    3. Lumbosacral radiculopathy    4. Diabetic peripheral neuropathy    5. Abnormal creatinine clearance glomerular filtration    6. Sjogren's syndrome, with unspecified organ involvement    7. Status post total bilateral knee replacement    8. Trochanteric bursitis, unspecified laterality            Plan:        will continue with current medications and dosis     "

## 2020-03-24 RX ORDER — GABAPENTIN 300 MG/1
CAPSULE ORAL
Qty: 90 CAPSULE | Refills: 3 | Status: SHIPPED | OUTPATIENT
Start: 2020-03-24 | End: 2020-07-16 | Stop reason: SDUPTHER

## 2020-06-26 ENCOUNTER — OFFICE VISIT (OUTPATIENT)
Dept: RHEUMATOLOGY | Facility: CLINIC | Age: 75
End: 2020-06-26
Payer: MEDICARE

## 2020-06-26 VITALS
DIASTOLIC BLOOD PRESSURE: 92 MMHG | HEIGHT: 62 IN | HEART RATE: 77 BPM | TEMPERATURE: 97 F | WEIGHT: 235 LBS | SYSTOLIC BLOOD PRESSURE: 174 MMHG | RESPIRATION RATE: 16 BRPM | BODY MASS INDEX: 43.24 KG/M2

## 2020-06-26 DIAGNOSIS — M06.9 RHEUMATOID ARTHRITIS, INVOLVING UNSPECIFIED SITE, UNSPECIFIED RHEUMATOID FACTOR PRESENCE: Primary | ICD-10-CM

## 2020-06-26 DIAGNOSIS — M54.17 LUMBOSACRAL RADICULOPATHY: ICD-10-CM

## 2020-06-26 DIAGNOSIS — M35.00 SJOGREN'S SYNDROME, WITH UNSPECIFIED ORGAN INVOLVEMENT: ICD-10-CM

## 2020-06-26 DIAGNOSIS — Z96.653 STATUS POST TOTAL BILATERAL KNEE REPLACEMENT: ICD-10-CM

## 2020-06-26 DIAGNOSIS — E11.42 DIABETIC PERIPHERAL NEUROPATHY: ICD-10-CM

## 2020-06-26 DIAGNOSIS — M05.79 RHEUMATOID ARTHRITIS INVOLVING MULTIPLE SITES WITH POSITIVE RHEUMATOID FACTOR: ICD-10-CM

## 2020-06-26 DIAGNOSIS — M17.0 PRIMARY OSTEOARTHRITIS OF BOTH KNEES: ICD-10-CM

## 2020-06-26 DIAGNOSIS — M54.16 LUMBAR RADICULAR PAIN: ICD-10-CM

## 2020-06-26 DIAGNOSIS — M70.60 TROCHANTERIC BURSITIS, UNSPECIFIED LATERALITY: ICD-10-CM

## 2020-06-26 PROCEDURE — 99214 OFFICE O/P EST MOD 30 MIN: CPT | Mod: S$GLB,,, | Performed by: INTERNAL MEDICINE

## 2020-06-26 PROCEDURE — 99214 PR OFFICE/OUTPT VISIT, EST, LEVL IV, 30-39 MIN: ICD-10-PCS | Mod: S$GLB,,, | Performed by: INTERNAL MEDICINE

## 2020-06-26 NOTE — PROGRESS NOTES
"Subjective:       Patient ID: Loly Tucker is a 74 y.o. female.    Chief Complaint: Follow-up    HPI  Using Methotrexate 8 tbas a weekl and leflunomide and Folic acid 2 m,g . Taking Gabapentin 2300 mg [id number] am an2 hsHas notice pain and swellin and pain 1n wrist  amnd low bnack and sciatica   Review of Systems   Constitutional: Negative.    HENT:        Dryness in eyes and mouth   Eyes: Negative.    Respiratory: Negative.    Cardiovascular: Negative.    Gastrointestinal: Negative.    Endocrine:        AODM .omn Lantus insulimn   Genitourinary: Negative.    Musculoskeletal: Positive for arthralgias and back pain.   Allergic/Immunologic: Negative.    Neurological:        Numbness    Hematological: Negative.    Psychiatric/Behavioral: Negative.          Objective:   BP (!) 174/92 (BP Location: Left arm, Patient Position: Sitting, BP Method: Large (Automatic))   Pulse 77   Temp 96.7 °F (35.9 °C) (Temporal)   Resp 16   Ht 5' 2" (1.575 m)   Wt 106.6 kg (235 lb)   BMI 42.98 kg/m²      Physical Exam   Constitutional: She is oriented to person, place, and time and well-developed, well-nourished, and in no distress.   obesity   HENT:   Head: Normocephalic and atraumatic.   Eyes: Conjunctivae and EOM are normal. Pupils are equal, round, and reactive to light.   Neck: Normal range of motion. Neck supple.   Cardiovascular: Normal rate and regular rhythm.    Pulmonary/Chest: Effort normal.   Abdominal: Soft. Bowel sounds are normal.   Neurological: She is alert and oriented to person, place, and time.   SLR=noprmal   Skin: Skin is warm and dry.     Psychiatric: Mood, memory, affect and judgment normal.   Musculoskeletal:      Comments: Normal musculoesketal exam          No data to display     Assessment:       1. Rheumatoid arthritis, involving unspecified site, unspecified rheumatoid factor presence    2. Trochanteric bursitis, unspecified laterality    3. Primary osteoarthritis of both knees    4. Status post " total bilateral knee replacement    5. Rheumatoid arthritis involving multiple sites with positive rheumatoid factor    6. Sjogren's syndrome, with unspecified organ involvement    7. Lumbosacral radiculopathy    8. Lumbar radicular pain    9. Diabetic peripheral neuropathy            Plan:       Problem List Items Addressed This Visit        Neuro    Lumbosacral radiculopathy    Diabetic peripheral neuropathy    Lumbar radicular pain       Immunology/Multi System    Sjogren's syndrome       Orthopedic    Osteoarthritis of knee    Bursitis of hip    Rheumatoid arthritis - Primary    Rheumatoid arthritis involving multiple sites with positive rheumatoid factor    Status post total bilateral knee replacement        Will coninue current   medicatioon

## 2020-07-16 DIAGNOSIS — M06.9 RHEUMATOID ARTHRITIS, INVOLVING UNSPECIFIED SITE, UNSPECIFIED RHEUMATOID FACTOR PRESENCE: Primary | ICD-10-CM

## 2020-07-16 RX ORDER — GABAPENTIN 300 MG/1
CAPSULE ORAL
Qty: 90 CAPSULE | Refills: 3 | Status: SHIPPED | OUTPATIENT
Start: 2020-07-16 | End: 2023-11-15

## 2020-07-20 DIAGNOSIS — M06.9 RHEUMATOID ARTHRITIS, INVOLVING UNSPECIFIED SITE, UNSPECIFIED RHEUMATOID FACTOR PRESENCE: Primary | ICD-10-CM

## 2020-07-22 DIAGNOSIS — M06.9 RHEUMATOID ARTHRITIS, INVOLVING UNSPECIFIED SITE, UNSPECIFIED RHEUMATOID FACTOR PRESENCE: Primary | ICD-10-CM

## 2020-07-22 RX ORDER — LEFLUNOMIDE 10 MG/1
TABLET ORAL
Qty: 180 TABLET | Refills: 0 | Status: SHIPPED | OUTPATIENT
Start: 2020-07-22 | End: 2023-11-15

## 2020-07-22 RX ORDER — LEFLUNOMIDE 10 MG/1
TABLET ORAL
Qty: 180 TABLET | Refills: 3 | OUTPATIENT
Start: 2020-07-22

## 2020-08-04 DIAGNOSIS — G47.00 INSOMNIA, UNSPECIFIED TYPE: ICD-10-CM

## 2020-08-04 DIAGNOSIS — M06.9 RHEUMATOID ARTHRITIS, INVOLVING UNSPECIFIED SITE, UNSPECIFIED RHEUMATOID FACTOR PRESENCE: ICD-10-CM

## 2020-08-04 RX ORDER — LEFLUNOMIDE 10 MG/1
TABLET ORAL
Qty: 180 TABLET | Refills: 0 | Status: CANCELLED | OUTPATIENT
Start: 2020-08-04

## 2020-08-06 RX ORDER — TEMAZEPAM 30 MG/1
CAPSULE ORAL
Qty: 90 CAPSULE | Refills: 0 | Status: SHIPPED | OUTPATIENT
Start: 2020-08-06

## 2020-08-06 RX ORDER — FOLIC ACID 1 MG/1
1000 TABLET ORAL DAILY
Qty: 90 TABLET | Refills: 0 | Status: SHIPPED | OUTPATIENT
Start: 2020-08-06

## 2020-08-06 RX ORDER — METHOTREXATE 2.5 MG/1
TABLET ORAL
Qty: 84 TABLET | Refills: 0 | Status: SHIPPED | OUTPATIENT
Start: 2020-08-06 | End: 2023-11-15

## 2020-08-06 RX ORDER — PREDNISONE 10 MG/1
TABLET ORAL
Qty: 45 TABLET | Refills: 0 | Status: SHIPPED | OUTPATIENT
Start: 2020-08-06 | End: 2023-11-15

## 2020-08-10 ENCOUNTER — TELEPHONE (OUTPATIENT)
Dept: FAMILY MEDICINE | Facility: CLINIC | Age: 75
End: 2020-08-10

## 2020-08-10 NOTE — TELEPHONE ENCOUNTER
I called pharmacy back and they wanted to confirm the qty of the prednisone that was given. I let them know that that is how Dr. Webb has sent it in and Dr. Smith was doing a courtesy fill for this one time.

## 2020-08-10 NOTE — TELEPHONE ENCOUNTER
----- Message from Ashia Tavares sent at 8/10/2020 11:54 AM CDT -----  [facility] need to speak to nurse regarding quantity on patient prescription. Call back number  [phone]. Fax number [phone]. Tks

## 2023-11-14 DIAGNOSIS — N30.90 CYSTITIS: Primary | ICD-10-CM

## 2023-11-15 ENCOUNTER — OFFICE VISIT (OUTPATIENT)
Dept: UROLOGY | Facility: CLINIC | Age: 78
End: 2023-11-15
Payer: MEDICARE

## 2023-11-15 VITALS
SYSTOLIC BLOOD PRESSURE: 137 MMHG | HEIGHT: 62 IN | DIASTOLIC BLOOD PRESSURE: 65 MMHG | WEIGHT: 191 LBS | BODY MASS INDEX: 35.15 KG/M2 | HEART RATE: 56 BPM

## 2023-11-15 DIAGNOSIS — N30.90 CYSTITIS: ICD-10-CM

## 2023-11-15 DIAGNOSIS — N39.0 RECURRENT UTI: Primary | ICD-10-CM

## 2023-11-15 LAB
BILIRUBIN, UA POC OHS: NEGATIVE
BLOOD, UA POC OHS: ABNORMAL
CLARITY, UA POC OHS: CLEAR
COLOR, UA POC OHS: YELLOW
GLUCOSE, UA POC OHS: NEGATIVE
KETONES, UA POC OHS: NEGATIVE
LEUKOCYTES, UA POC OHS: ABNORMAL
NITRITE, UA POC OHS: NEGATIVE
PH, UA POC OHS: 5.5
PROTEIN, UA POC OHS: 100
SPECIFIC GRAVITY, UA POC OHS: 1.01
UROBILINOGEN, UA POC OHS: 0.2

## 2023-11-15 PROCEDURE — 99204 OFFICE O/P NEW MOD 45 MIN: CPT | Mod: S$GLB,,, | Performed by: FAMILY MEDICINE

## 2023-11-15 PROCEDURE — 99204 PR OFFICE/OUTPT VISIT, NEW, LEVL IV, 45-59 MIN: ICD-10-PCS | Mod: S$GLB,,, | Performed by: FAMILY MEDICINE

## 2023-11-15 PROCEDURE — 81003 POCT URINALYSIS(INSTRUMENT): ICD-10-PCS | Mod: QW,S$GLB,, | Performed by: FAMILY MEDICINE

## 2023-11-15 PROCEDURE — 81003 URINALYSIS AUTO W/O SCOPE: CPT | Mod: QW,S$GLB,, | Performed by: FAMILY MEDICINE

## 2023-11-15 RX ORDER — NITROFURANTOIN 25; 75 MG/1; MG/1
100 CAPSULE ORAL DAILY
Qty: 90 CAPSULE | Refills: 4 | Status: SHIPPED | OUTPATIENT
Start: 2023-11-15 | End: 2024-02-13

## 2023-11-15 RX ORDER — NITROFURANTOIN 25; 75 MG/1; MG/1
CAPSULE ORAL
COMMUNITY
Start: 2023-11-13 | End: 2024-02-21

## 2023-11-15 RX ORDER — METOPROLOL SUCCINATE 25 MG/1
TABLET, EXTENDED RELEASE ORAL
COMMUNITY
Start: 2023-08-26

## 2023-11-15 RX ORDER — BISACODYL 5 MG/1
TABLET, COATED ORAL
COMMUNITY

## 2023-11-15 RX ORDER — ROSUVASTATIN CALCIUM 10 MG/1
10 TABLET, COATED ORAL
COMMUNITY
Start: 2023-09-28

## 2023-11-15 RX ORDER — CALCIUM CARBONATE 500(1250)
TABLET,CHEWABLE ORAL
COMMUNITY

## 2023-11-15 RX ORDER — PEN NEEDLE, DIABETIC 32GX 5/32"
NEEDLE, DISPOSABLE MISCELLANEOUS
COMMUNITY
Start: 2023-11-10

## 2023-11-15 RX ORDER — HYDROCODONE BITARTRATE AND ACETAMINOPHEN 10; 325 MG/1; MG/1
1 TABLET ORAL EVERY 4 HOURS PRN
COMMUNITY
Start: 2023-09-05

## 2023-11-15 NOTE — PROGRESS NOTES
"Subjective:       Patient ID: Loly Tucker is a 78 y.o. female.    Chief Complaint: Cystitis      HPI: 78-year-old female patient presenting to the clinic to establish care.  Patient complains of frequent urinary tract infections.  She states that she has had at least 6 this year but probably more.  Symptoms of infection include dysuria frequency and urgency.  She is a diabetic on insulin.  She is currently on Macrobid to treat an infection.         Past Medical History:   Past Medical History:   Diagnosis Date    Acid reflux     Allergy     Arthritis     Cataract     Diabetes mellitus     Disorder of kidney and ureter     Dry eyes     Dry mouth     Hypertension        Past Surgical Historical:   Past Surgical History:   Procedure Laterality Date    EYE SURGERY      FRACTURE SURGERY      HYSTERECTOMY      JOINT REPLACEMENT      KNEE SURGERY Bilateral         Medications:   Medication List with Changes/Refills   Current Medications    BD WALTER 2ND GEN PEN NEEDLE 32 GAUGE X 5/32" NDLE        BD ULTRA-FINE SHORT PEN NEEDLE 31 GAUGE X 5/16" NDLE    USE ONCE AT BEDTIME IN CONJUNCTION WITH INSULIN PEN.    CALCIUM CARBONATE (OS-HOANG) 500 MG CALCIUM (1,250 MG) CHEWABLE TABLET    Take by mouth.    DENOSUMAB (PROLIA) 60 MG/ML SYRG    Inject into the skin.    FOLIC ACID (FOLVITE) 1 MG TABLET    Take 1 tablet (1,000 mcg total) by mouth once daily.    HYDROCODONE-ACETAMINOPHEN (NORCO)  MG PER TABLET    Take 1 tablet by mouth every 4 (four) hours as needed.    HYDROCODONE-ACETAMINOPHEN (NORCO) 7.5-325 MG PER TABLET    hydrocodone 7.5 mg-acetaminophen 325 mg tablet    INSULIN (LANTUS SOLOSTAR U-100 INSULIN) GLARGINE 100 UNITS/ML (3ML) SUBQ PEN    Lantus Solostar U-100 Insulin 100 unit/mL (3 mL) subcutaneous pen   INJECT 14 UNITS SC HS    LOSARTAN (COZAAR) 100 MG TABLET    TK 1 T PO D    METOPROLOL SUCCINATE (TOPROL-XL) 25 MG 24 HR TABLET    TAKE 1 TABLET BY MOUTH DAILY IN THE MORNING    MULTIVITAMIN-MINERALS-LUTEIN " (MULTIVITAMIN 50 PLUS) TAB    Multivitamin 50 Plus tablet   Take 1 tablet every day by oral route.    NITROFURANTOIN, MACROCRYSTAL-MONOHYDRATE, (MACROBID) 100 MG CAPSULE        ROSUVASTATIN (CRESTOR) 10 MG TABLET    Take 10 mg by mouth.    TEMAZEPAM (RESTORIL) 30 MG CAPSULE    Take 1 tablet once daily at bedtime    TRADJENTA 5 MG TAB TABLET    TK 1 T PO D   Discontinued Medications    AMLODIPINE (NORVASC) 5 MG TABLET    amlodipine 5 mg tablet    DULOXETINE (CYMBALTA) 60 MG CAPSULE    duloxetine 60 mg capsule,delayed release   TAKE 1 CAPSULE BY MOUTH EVERY DAY IN THE EVENING    GABAPENTIN (NEURONTIN) 300 MG CAPSULE    TAKE ONE CAPSULE BY MOUTH THREE TIMES DAILY AFTER MEALS    L-METHYL-B6-B12 3-35-2 MG TAB    TAKE 1 TABLET BY MOUTH EVERY DAY    L-METHYLFOLATE-B2-B6-B12 (CEREFOLIN) 6-5-50-1 MG TAB    Take 1 tablet by mouth once daily.    LEFLUNOMIDE (ARAVA) 10 MG TAB    TAKE 1 TABLET BY MOUTH TWICE DAILY AFTER A MEAL    LEVOCETIRIZINE (XYZAL) 5 MG TABLET    TK 1 T PO HS    METHOTREXATE 2.5 MG TAB    TAKE 7 TABLETS BY MOUTH ONCE A WEEK ON MONDAYS.    MULTIVITAMIN WITH MINERALS TABLET    Multivitamin 50 Plus tablet   Take 1 tablet every day by oral route.    PANTOPRAZOLE (PROTONIX) 40 MG TABLET        PREDNISONE (DELTASONE) 10 MG TABLET    TAKE 1 TABLET THREE TIMES DAILY BY MOUTH FOR 3 DAYS, THEN 1 TABLET TWICE DAILY FOR 2 DAYS, THEN 1 TABLET A DAY FOR 2 DAYS.    VITAMIN D (VITAMIN D3) 1000 UNITS TAB    Vitamin D3 25 mcg (1,000 unit) tablet   Take 1 tablet every day by oral route.        Past Social History:   Social History     Socioeconomic History    Marital status:    Tobacco Use    Smoking status: Never    Smokeless tobacco: Never   Substance and Sexual Activity    Alcohol use: No    Drug use: Yes     Types: Hydrocodone       Allergies:   Review of patient's allergies indicates:   Allergen Reactions    Nsaids (non-steroidal anti-inflammatory drug) Other (See Comments)     Chronic kidney disease        Family  History:   Family History   Problem Relation Age of Onset    Cancer Mother     Hypertension Mother     Heart failure Mother     Cancer Father     Rheum arthritis Father         Review of Systems:  Review of Systems   Constitutional:  Negative for activity change, appetite change, chills, diaphoresis, fatigue, fever and unexpected weight change.   HENT:  Negative for congestion, dental problem, drooling, ear discharge, ear pain, facial swelling, hearing loss, mouth sores, nosebleeds, postnasal drip, rhinorrhea, sinus pressure, sinus pain, sneezing, sore throat, tinnitus, trouble swallowing and voice change.    Eyes:  Negative for photophobia, pain, discharge, redness, itching and visual disturbance.   Respiratory:  Negative for apnea, cough, choking, chest tightness, shortness of breath, wheezing and stridor.    Cardiovascular:  Negative for chest pain and leg swelling.   Gastrointestinal:  Negative for abdominal distention, abdominal pain, anal bleeding, blood in stool, constipation, diarrhea, nausea, rectal pain and vomiting.   Endocrine: Negative for cold intolerance, heat intolerance, polydipsia, polyphagia and polyuria.   Genitourinary:  Positive for frequency and urgency. Negative for decreased urine volume, difficulty urinating, dyspareunia, dysuria, enuresis, flank pain, genital sores, hematuria, menstrual problem, pelvic pain, vaginal bleeding, vaginal discharge and vaginal pain.   Musculoskeletal:  Negative for arthralgias, back pain, gait problem, joint swelling, myalgias, neck pain and neck stiffness.   Skin:  Negative for color change, pallor, rash and wound.   Allergic/Immunologic: Negative for environmental allergies, food allergies and immunocompromised state.   Neurological:  Negative for dizziness, tremors, seizures, syncope, facial asymmetry, speech difficulty, weakness, light-headedness, numbness and headaches.   Hematological:  Negative for adenopathy. Does not bruise/bleed easily.    Psychiatric/Behavioral:  Negative for agitation, behavioral problems, confusion, decreased concentration, dysphoric mood, hallucinations, self-injury, sleep disturbance and suicidal ideas. The patient is not nervous/anxious and is not hyperactive.        Physical Exam:  Physical Exam  Exam conducted with a chaperone present.   Constitutional:       Appearance: Normal appearance.   HENT:      Head: Normocephalic.      Nose: Nose normal.      Mouth/Throat:      Mouth: Mucous membranes are moist.      Pharynx: Oropharynx is clear.   Eyes:      Extraocular Movements: Extraocular movements intact.      Conjunctiva/sclera: Conjunctivae normal.      Pupils: Pupils are equal, round, and reactive to light.   Cardiovascular:      Rate and Rhythm: Normal rate and regular rhythm.      Pulses: Normal pulses.      Heart sounds: Normal heart sounds.   Pulmonary:      Effort: Pulmonary effort is normal.      Breath sounds: Normal breath sounds.   Abdominal:      General: Abdomen is flat. Bowel sounds are normal.      Palpations: Abdomen is soft.      Hernia: There is no hernia in the left inguinal area or right inguinal area.   Genitourinary:     General: Normal vulva.      Pubic Area: No rash or pubic lice.       Labia:         Right: No rash, tenderness, lesion or injury.         Left: No rash, tenderness, lesion or injury.       Urethra: No prolapse, urethral pain, urethral swelling or urethral lesion.      Rectum: Normal.   Musculoskeletal:         General: Normal range of motion.      Cervical back: Normal range of motion and neck supple.   Lymphadenopathy:      Lower Body: No right inguinal adenopathy. No left inguinal adenopathy.   Skin:     General: Skin is warm and dry.      Capillary Refill: Capillary refill takes less than 2 seconds.   Neurological:      General: No focal deficit present.      Mental Status: She is alert and oriented to person, place, and time. Mental status is at baseline.   Psychiatric:         Mood  and Affect: Mood normal.         Behavior: Behavior normal.         Thought Content: Thought content normal.         Judgment: Judgment normal.         Assessment/Plan:     Recurrent urinary tract infection:  Bladder scan today is 0 mL.  Patient was started on Macrobid b.i.d. for 10 days.  Instructed her to complete that antibiotic.  We will complete urine culture today.  PCP did not obtain urine sample prior to starting her antibiotic.  Instructed patient to complete urine drop-off with our office in the future if symptoms develop.  Patient asked if we could fax an order to [facility] if she is unable to make it to clinic and I said that was fine.  Started patient on prophylactic Macrobid 100 mg daily following treatment of this infection.  Problem List Items Addressed This Visit    None  Visit Diagnoses       Cystitis

## 2023-11-17 ENCOUNTER — TELEPHONE (OUTPATIENT)
Dept: UROLOGY | Facility: CLINIC | Age: 78
End: 2023-11-17
Payer: MEDICARE

## 2023-11-17 LAB — URINE CULTURE, ROUTINE: NORMAL

## 2023-11-17 NOTE — TELEPHONE ENCOUNTER
Tried to call and leave a message, pts phone there is no answer. And rings till its busy    ----- Message from Shima Burns NP sent at 11/17/2023 11:29 AM CST -----  Please notify patient of urine culture results.  No additional need for antibiotic at this time

## 2024-02-21 ENCOUNTER — OFFICE VISIT (OUTPATIENT)
Dept: UROLOGY | Facility: CLINIC | Age: 79
End: 2024-02-21
Payer: MEDICARE

## 2024-02-21 VITALS
SYSTOLIC BLOOD PRESSURE: 144 MMHG | HEIGHT: 62 IN | WEIGHT: 191 LBS | DIASTOLIC BLOOD PRESSURE: 67 MMHG | HEART RATE: 91 BPM | BODY MASS INDEX: 35.15 KG/M2

## 2024-02-21 DIAGNOSIS — N39.0 RECURRENT UTI: Primary | ICD-10-CM

## 2024-02-21 DIAGNOSIS — N39.0 URINARY TRACT INFECTION WITHOUT HEMATURIA, SITE UNSPECIFIED: ICD-10-CM

## 2024-02-21 LAB
BILIRUBIN, UA POC OHS: NEGATIVE
BLOOD, UA POC OHS: ABNORMAL
CLARITY, UA POC OHS: ABNORMAL
COLOR, UA POC OHS: YELLOW
GLUCOSE, UA POC OHS: 250
KETONES, UA POC OHS: NEGATIVE
LEUKOCYTES, UA POC OHS: ABNORMAL
NITRITE, UA POC OHS: NEGATIVE
PH, UA POC OHS: 6
PROTEIN, UA POC OHS: 30
SPECIFIC GRAVITY, UA POC OHS: 1.02
UROBILINOGEN, UA POC OHS: 0.2

## 2024-02-21 PROCEDURE — 99214 OFFICE O/P EST MOD 30 MIN: CPT | Mod: S$GLB,,, | Performed by: FAMILY MEDICINE

## 2024-02-21 PROCEDURE — 81003 URINALYSIS AUTO W/O SCOPE: CPT | Mod: QW,S$GLB,, | Performed by: FAMILY MEDICINE

## 2024-02-21 RX ORDER — SULFAMETHOXAZOLE AND TRIMETHOPRIM 800; 160 MG/1; MG/1
1 TABLET ORAL 2 TIMES DAILY
Qty: 14 TABLET | Refills: 0 | Status: SHIPPED | OUTPATIENT
Start: 2024-02-21 | End: 2024-02-28

## 2024-02-21 RX ORDER — TEMAZEPAM 30 MG/1
1 CAPSULE ORAL NIGHTLY
COMMUNITY

## 2024-02-21 RX ORDER — AMLODIPINE BESYLATE 10 MG/1
10 TABLET ORAL
COMMUNITY

## 2024-02-21 NOTE — LETTER
February 21, 2024          No Recipients             [facility]  [address] [address]  Phone: [phone]  Fax: [phone]   Patient: Loly Tucker   MR Number: [id number]   YOB: 1945   Date of Visit: 2/21/2024       Dear Dr. Brown Recipients:    Thank you for referring Loly Tucker to me for evaluation. Below are the relevant portions of my assessment and plan of care.            If you have questions, please do not hesitate to call me. I look forward to following Loly along with you.    Sincerely,      Shima Burns, NP           CC    No Recipients

## 2024-02-21 NOTE — PROGRESS NOTES
"Subjective:       Patient ID: Loly Tucker is a 78 y.o. female.    Chief Complaint: Urinary Tract Infection      HPI: 78-year-old female patient presenting to the clinic to follow up for recurrent urinary tract infection.  Patient was put on daily Macrobid and was doing well not experiencing infection.  Her primary care doctor took her off of Macrobid and she developed symptoms of infection shortly after.  Symptoms include lower abdominal pain, dysuria, and strong smelling urine.  Patient has a history of decreased kidney function and was diagnosed with CKD stage 3 and is monitored by Dr. Dunham.  The patient was also diagnosed with rheumatoid arthritis.  She is unable to receive treatments while having active infections.         Past Medical History:   Past Medical History:   Diagnosis Date    Acid reflux     Allergy     Arthritis     Cataract     Diabetes mellitus     Disorder of kidney and ureter     Dry eyes     Dry mouth     Hypertension        Past Surgical Historical:   Past Surgical History:   Procedure Laterality Date    EYE SURGERY      FRACTURE SURGERY      HYSTERECTOMY      JOINT REPLACEMENT      KNEE SURGERY Bilateral         Medications:   Medication List with Changes/Refills   Current Medications    AMLODIPINE (NORVASC) 10 MG TABLET    Take 10 mg by mouth.    BD WALTER 2ND GEN PEN NEEDLE 32 GAUGE X 5/32" NDLE        BD ULTRA-FINE SHORT PEN NEEDLE 31 GAUGE X 5/16" NDLE    USE ONCE AT BEDTIME IN CONJUNCTION WITH INSULIN PEN.    CALCIUM CARBONATE (OS-HOANG) 500 MG CALCIUM (1,250 MG) CHEWABLE TABLET    Take by mouth.    DENOSUMAB (PROLIA) 60 MG/ML SYRG    Inject into the skin.    FOLIC ACID (FOLVITE) 1 MG TABLET    Take 1 tablet (1,000 mcg total) by mouth once daily.    HYDROCODONE-ACETAMINOPHEN (NORCO)  MG PER TABLET    Take 1 tablet by mouth every 4 (four) hours as needed.    INSULIN (LANTUS SOLOSTAR U-100 INSULIN) GLARGINE 100 UNITS/ML (3ML) SUBQ PEN    Lantus Solostar U-100 Insulin 100 unit/mL " (3 mL) subcutaneous pen   INJECT 14 UNITS SC HS    METOPROLOL SUCCINATE (TOPROL-XL) 25 MG 24 HR TABLET    TAKE 1 TABLET BY MOUTH DAILY IN THE MORNING    MULTIVITAMIN-MINERALS-LUTEIN (MULTIVITAMIN 50 PLUS) TAB    Multivitamin 50 Plus tablet   Take 1 tablet every day by oral route.    ROSUVASTATIN (CRESTOR) 10 MG TABLET    Take 10 mg by mouth.    TEMAZEPAM (RESTORIL) 30 MG CAPSULE    Take 1 tablet once daily at bedtime    TEMAZEPAM (RESTORIL) 30 MG CAPSULE    Take 1 capsule by mouth every evening.    TRADJENTA 5 MG TAB TABLET    TK 1 T PO D   Discontinued Medications    HYDROCODONE-ACETAMINOPHEN (NORCO) 7.5-325 MG PER TABLET    hydrocodone 7.5 mg-acetaminophen 325 mg tablet    LOSARTAN (COZAAR) 100 MG TABLET    TK 1 T PO D    NITROFURANTOIN, MACROCRYSTAL-MONOHYDRATE, (MACROBID) 100 MG CAPSULE            Past Social History:   Social History     Socioeconomic History    Marital status:    Tobacco Use    Smoking status: Never    Smokeless tobacco: Never   Substance and Sexual Activity    Alcohol use: No    Drug use: Yes     Types: Hydrocodone       Allergies:   Review of patient's allergies indicates:   Allergen Reactions    Nsaids (non-steroidal anti-inflammatory drug) Other (See Comments)     Chronic kidney disease        Family History:   Family History   Problem Relation Age of Onset    Cancer Mother     Hypertension Mother     Heart failure Mother     Cancer Father     Rheum arthritis Father         Review of Systems:  Review of Systems   Constitutional:  Negative for activity change, appetite change, chills, diaphoresis, fatigue, fever and unexpected weight change.   HENT:  Negative for congestion, dental problem, drooling, ear discharge, ear pain, facial swelling, hearing loss, mouth sores, nosebleeds, postnasal drip, rhinorrhea, sinus pressure, sinus pain, sneezing, sore throat, tinnitus, trouble swallowing and voice change.    Eyes:  Negative for photophobia, pain, discharge, redness, itching and visual  disturbance.   Respiratory:  Negative for apnea, cough, choking, chest tightness, shortness of breath, wheezing and stridor.    Cardiovascular:  Negative for chest pain and leg swelling.   Gastrointestinal:  Negative for abdominal distention, abdominal pain, anal bleeding, blood in stool, constipation, diarrhea, nausea, rectal pain and vomiting.   Endocrine: Negative for cold intolerance, heat intolerance, polydipsia, polyphagia and polyuria.   Genitourinary:  Positive for dysuria, frequency and urgency. Negative for decreased urine volume, difficulty urinating, dyspareunia, enuresis, flank pain, genital sores, hematuria, menstrual problem, pelvic pain, vaginal bleeding, vaginal discharge and vaginal pain.   Musculoskeletal:  Negative for arthralgias, back pain, gait problem, joint swelling, myalgias, neck pain and neck stiffness.   Skin:  Negative for color change, pallor, rash and wound.   Allergic/Immunologic: Negative for environmental allergies, food allergies and immunocompromised state.   Neurological:  Negative for dizziness, tremors, seizures, syncope, facial asymmetry, speech difficulty, weakness, light-headedness, numbness and headaches.   Hematological:  Negative for adenopathy. Does not bruise/bleed easily.   Psychiatric/Behavioral:  Negative for agitation, behavioral problems, confusion, decreased concentration, dysphoric mood, hallucinations, self-injury, sleep disturbance and suicidal ideas. The patient is not nervous/anxious and is not hyperactive.        Physical Exam:  Physical Exam  Exam conducted with a chaperone present.   Constitutional:       Appearance: Normal appearance.   HENT:      Head: Normocephalic.      Nose: Nose normal.      Mouth/Throat:      Mouth: Mucous membranes are moist.      Pharynx: Oropharynx is clear.   Eyes:      Extraocular Movements: Extraocular movements intact.      Conjunctiva/sclera: Conjunctivae normal.      Pupils: Pupils are equal, round, and reactive to light.    Cardiovascular:      Rate and Rhythm: Normal rate and regular rhythm.      Pulses: Normal pulses.      Heart sounds: Normal heart sounds.   Pulmonary:      Effort: Pulmonary effort is normal.      Breath sounds: Normal breath sounds.   Abdominal:      General: Abdomen is flat. Bowel sounds are normal.      Palpations: Abdomen is soft.      Hernia: There is no hernia in the left inguinal area or right inguinal area.   Genitourinary:     General: Normal vulva.      Pubic Area: No rash or pubic lice.       Labia:         Right: No rash, tenderness, lesion or injury.         Left: No rash, tenderness, lesion or injury.       Urethra: No prolapse, urethral pain, urethral swelling or urethral lesion.      Rectum: Normal.   Musculoskeletal:         General: Normal range of motion.      Cervical back: Normal range of motion and neck supple.   Lymphadenopathy:      Lower Body: No right inguinal adenopathy. No left inguinal adenopathy.   Skin:     General: Skin is warm and dry.      Capillary Refill: Capillary refill takes less than 2 seconds.   Neurological:      General: No focal deficit present.      Mental Status: She is alert and oriented to person, place, and time. Mental status is at baseline.   Psychiatric:         Mood and Affect: Mood normal.         Behavior: Behavior normal.         Thought Content: Thought content normal.         Judgment: Judgment normal.         Assessment/Plan:     Recurrent urinary tract infection:  Consulted Dr. Haynes and he recommended resuming daily Macrobid 100 mg.  Follow up with Dr. Ragini martinez.  Patient has symptoms of infection today.  We will complete urine culture and start her on Bactrim at this time.  Instructed patient to resume daily Macrobid following treatment for current infection.  Problem List Items Addressed This Visit    None

## 2024-02-24 LAB — URINE CULTURE, ROUTINE: NORMAL

## 2024-02-26 ENCOUNTER — TELEPHONE (OUTPATIENT)
Dept: UROLOGY | Facility: CLINIC | Age: 79
End: 2024-02-26
Payer: MEDICARE

## 2024-02-26 NOTE — TELEPHONE ENCOUNTER
----- Message from Shima Burns NP sent at 2/26/2024  3:44 PM CST -----  Urine culture shows E coli.  Patient is currently on appropriate antibiotic.  Please instruct patient to continue entire course of antibiotics.  Follow up if symptoms worsen or do not improve

## 2024-02-26 NOTE — TELEPHONE ENCOUNTER
Spoke with pt regarding UA culture results. Educated pt to finish the full course of antibiotics that were prescribed, but if she develops symptoms or worsens, to contact clinic for follow up. Pt verbalized understanding.

## 2024-06-20 ENCOUNTER — OFFICE VISIT (OUTPATIENT)
Dept: UROLOGY | Facility: CLINIC | Age: 79
End: 2024-06-20
Payer: MEDICARE

## 2024-06-20 VITALS
DIASTOLIC BLOOD PRESSURE: 56 MMHG | HEART RATE: 96 BPM | HEIGHT: 62 IN | SYSTOLIC BLOOD PRESSURE: 133 MMHG | BODY MASS INDEX: 35.15 KG/M2 | WEIGHT: 191 LBS

## 2024-06-20 DIAGNOSIS — N39.0 FREQUENT UTI: Primary | ICD-10-CM

## 2024-06-20 LAB
BILIRUBIN, UA POC OHS: NEGATIVE
BLOOD, UA POC OHS: NEGATIVE
CLARITY, UA POC OHS: CLEAR
COLOR, UA POC OHS: YELLOW
GLUCOSE, UA POC OHS: NEGATIVE
KETONES, UA POC OHS: NEGATIVE
LEUKOCYTES, UA POC OHS: ABNORMAL
NITRITE, UA POC OHS: NEGATIVE
PH, UA POC OHS: 5.5
PROTEIN, UA POC OHS: NEGATIVE
SPECIFIC GRAVITY, UA POC OHS: 1.01
UROBILINOGEN, UA POC OHS: 0.2

## 2024-06-20 RX ORDER — NITROFURANTOIN 25; 75 MG/1; MG/1
CAPSULE ORAL
COMMUNITY
Start: 2024-03-31 | End: 2024-06-20 | Stop reason: ALTCHOICE

## 2024-06-20 NOTE — PROGRESS NOTES
"Subjective:       Patient ID: Loly Tucker is a 78 y.o. female.    Chief Complaint: Recurrent UTI      HPI: 78-year-old female established patient presents today for six-month follow up due to recurrent urinary tract infections.  At her last appointment she was started on Macrobid daily due to recurrent urinary tract infections.  She presents today reporting that she has had no breakthrough infections in the last 6 months.  Patient would like to discuss stopping the daily antibiotic.  Patient is not a candidate for vaginal estrogen cream due to her history of breast cancer.  She denies any lower urinary tract symptoms today       Past Medical History:   Past Medical History:   Diagnosis Date    Acid reflux     Allergy     Arthritis     Cataract     Diabetes mellitus     Disorder of kidney and ureter     Dry eyes     Dry mouth     Hypertension        Past Surgical Historical:   Past Surgical History:   Procedure Laterality Date    EYE SURGERY      FRACTURE SURGERY      HYSTERECTOMY      JOINT REPLACEMENT      KNEE SURGERY Bilateral         Medications:   Medication List with Changes/Refills   Current Medications    AMLODIPINE (NORVASC) 10 MG TABLET    Take 10 mg by mouth.    BD WALTER 2ND GEN PEN NEEDLE 32 GAUGE X 5/32" NDLE        BD ULTRA-FINE SHORT PEN NEEDLE 31 GAUGE X 5/16" NDLE    USE ONCE AT BEDTIME IN CONJUNCTION WITH INSULIN PEN.    CALCIUM CARBONATE (OS-HOANG) 500 MG CALCIUM (1,250 MG) CHEWABLE TABLET    Take by mouth.    DENOSUMAB (PROLIA) 60 MG/ML SYRG    Inject into the skin.    FOLIC ACID (FOLVITE) 1 MG TABLET    Take 1 tablet (1,000 mcg total) by mouth once daily.    HYDROCODONE-ACETAMINOPHEN (NORCO)  MG PER TABLET    Take 1 tablet by mouth every 4 (four) hours as needed.    INSULIN (LANTUS SOLOSTAR U-100 INSULIN) GLARGINE 100 UNITS/ML (3ML) SUBQ PEN    Lantus Solostar U-100 Insulin 100 unit/mL (3 mL) subcutaneous pen   INJECT 14 UNITS SC HS    METOPROLOL SUCCINATE (TOPROL-XL) 25 MG 24 HR TABLET "    TAKE 1 TABLET BY MOUTH DAILY IN THE MORNING    MULTIVITAMIN-MINERALS-LUTEIN (MULTIVITAMIN 50 PLUS) TAB    Multivitamin 50 Plus tablet   Take 1 tablet every day by oral route.    NITROFURANTOIN, MACROCRYSTAL-MONOHYDRATE, (MACROBID) 100 MG CAPSULE        RITUXIMAB (RITUXAN IV)    Inject into the vein.    ROSUVASTATIN (CRESTOR) 10 MG TABLET    Take 10 mg by mouth.    TEMAZEPAM (RESTORIL) 30 MG CAPSULE    Take 1 capsule by mouth every evening.    TRADJENTA 5 MG TAB TABLET    TK 1 T PO D   Discontinued Medications    TEMAZEPAM (RESTORIL) 30 MG CAPSULE    Take 1 tablet once daily at bedtime        Past Social History:   Social History     Socioeconomic History    Marital status:    Tobacco Use    Smoking status: Never    Smokeless tobacco: Never   Substance and Sexual Activity    Alcohol use: No    Drug use: Yes     Types: Hydrocodone       Allergies:   Review of patient's allergies indicates:   Allergen Reactions    Nsaids (non-steroidal anti-inflammatory drug) Other (See Comments)     Chronic kidney disease        Family History:   Family History   Problem Relation Name Age of Onset    Cancer Mother      Hypertension Mother      Heart failure Mother      Cancer Father      Rheum arthritis Father          Review of Systems:  Review of Systems   Constitutional:  Negative for activity change, appetite change, chills, diaphoresis, fatigue, fever and unexpected weight change.   HENT:  Negative for congestion, dental problem, drooling, ear discharge, ear pain, facial swelling, hearing loss, mouth sores, nosebleeds, postnasal drip, rhinorrhea, sinus pressure, sinus pain, sneezing, sore throat, tinnitus, trouble swallowing and voice change.    Eyes:  Negative for photophobia, pain, discharge, redness, itching and visual disturbance.   Respiratory:  Negative for apnea, cough, choking, chest tightness, shortness of breath, wheezing and stridor.    Cardiovascular:  Negative for chest pain and leg swelling.    Gastrointestinal:  Negative for abdominal distention, abdominal pain, anal bleeding, blood in stool, constipation, diarrhea, nausea, rectal pain and vomiting.   Endocrine: Negative for cold intolerance, heat intolerance, polydipsia, polyphagia and polyuria.   Genitourinary: Negative.  Negative for decreased urine volume, difficulty urinating, dyspareunia, dysuria, enuresis, flank pain, frequency, genital sores, hematuria, menstrual problem, pelvic pain, urgency, vaginal bleeding, vaginal discharge and vaginal pain.   Musculoskeletal:  Negative for arthralgias, back pain, gait problem, joint swelling, myalgias, neck pain and neck stiffness.   Skin:  Negative for color change, pallor, rash and wound.   Allergic/Immunologic: Negative for environmental allergies, food allergies and immunocompromised state.   Neurological:  Negative for dizziness, tremors, seizures, syncope, facial asymmetry, speech difficulty, weakness, light-headedness, numbness and headaches.   Hematological:  Negative for adenopathy. Does not bruise/bleed easily.   Psychiatric/Behavioral:  Negative for agitation, behavioral problems, confusion, decreased concentration, dysphoric mood, hallucinations, self-injury, sleep disturbance and suicidal ideas. The patient is not nervous/anxious and is not hyperactive.      Physical Exam:  Physical Exam  Cardiovascular:      Rate and Rhythm: Normal rate.   Pulmonary:      Effort: Pulmonary effort is normal.   Abdominal:      General: Abdomen is flat. Bowel sounds are normal.      Palpations: Abdomen is soft.   Genitourinary:     General: Normal vulva.   Neurological:      Mental Status: She is alert and oriented to person, place, and time.   Urinalysis: WBCs 6-10, RBCs negative, epithelial 4+, bacteria +1, mucus negative, nitrite negative  Assessment/Plan:     Frequent UTI: Patient has an abnormal urinalysis today however she is completely asymptomatic and this is likely contamination.  Patient has not had  any breakthrough infections in the last 6 months while taking prophylactic Macrobid.  Patient wishes to stop taking this medication.  Discussed with patient that it was okay for her to stop her daily Macrobid at this time to see if she begins to develop any urinary tract infections while not on treatment.  Instructed patient at the start of any symptoms to provide a urine drop off here in clinic for evaluation and treatment as well as to send abnormal urinalysis for culture to develop a history.  Patient verbalized understanding.      Follow up in 6 months  Problem List Items Addressed This Visit    None  Visit Diagnoses       Frequent UTI    -  Primary

## 2024-07-05 ENCOUNTER — CLINICAL SUPPORT (OUTPATIENT)
Dept: UROLOGY | Facility: CLINIC | Age: 79
End: 2024-07-05
Payer: MEDICARE

## 2024-07-05 ENCOUNTER — TELEPHONE (OUTPATIENT)
Dept: UROLOGY | Facility: CLINIC | Age: 79
End: 2024-07-05

## 2024-07-05 ENCOUNTER — TELEPHONE (OUTPATIENT)
Dept: UROLOGY | Facility: CLINIC | Age: 79
End: 2024-07-05
Payer: MEDICARE

## 2024-07-05 DIAGNOSIS — R30.0 DYSURIA: Primary | ICD-10-CM

## 2024-07-05 DIAGNOSIS — R35.0 FREQUENCY OF URINATION: ICD-10-CM

## 2024-07-05 DIAGNOSIS — N39.0 URINARY TRACT INFECTION WITH HEMATURIA, SITE UNSPECIFIED: Primary | ICD-10-CM

## 2024-07-05 DIAGNOSIS — R31.9 URINARY TRACT INFECTION WITH HEMATURIA, SITE UNSPECIFIED: Primary | ICD-10-CM

## 2024-07-05 LAB
BILIRUBIN, UA POC OHS: NEGATIVE
BLOOD, UA POC OHS: ABNORMAL
CLARITY, UA POC OHS: ABNORMAL
COLOR, UA POC OHS: YELLOW
GLUCOSE, UA POC OHS: NEGATIVE
KETONES, UA POC OHS: NEGATIVE
LEUKOCYTES, UA POC OHS: ABNORMAL
NITRITE, UA POC OHS: NEGATIVE
PH, UA POC OHS: 6.5
PROTEIN, UA POC OHS: 100
SPECIFIC GRAVITY, UA POC OHS: 1.02
UROBILINOGEN, UA POC OHS: 0.2

## 2024-07-05 RX ORDER — SULFAMETHOXAZOLE AND TRIMETHOPRIM 800; 160 MG/1; MG/1
1 TABLET ORAL 2 TIMES DAILY
Qty: 14 TABLET | Refills: 0 | Status: SHIPPED | OUTPATIENT
Start: 2024-07-05 | End: 2024-07-12

## 2024-07-05 NOTE — PROGRESS NOTES
Pt came in for ua dropoff due to dysuria and frequency.     Pt notified that we are sending ua to be cultured and Bactrim was sent to pharmacy on file. Pt also notified that we will call her with results of culture and let her know if it indicates a change in antibiotics due to sensitivities.  Verbalized understanding.

## 2024-07-05 NOTE — TELEPHONE ENCOUNTER
Pt c/o of UTI symptoms. She will be in for drop off.----- Message from Yudy Bishop sent at 7/5/2024  8:41 AM CDT -----  Contact: self  Patient is requesting a call back regarding trying to come drop off urine sample this morning - thinks she has a UTI. Please call back at [phone]

## 2024-07-08 ENCOUNTER — TELEPHONE (OUTPATIENT)
Dept: UROLOGY | Facility: CLINIC | Age: 79
End: 2024-07-08
Payer: MEDICARE

## 2024-07-08 NOTE — TELEPHONE ENCOUNTER
Please call and inform patient her urine culture grew E coli.  She is currently on Bactrim which is the appropriate antibiotic for this infection.  Instruct her to complete entire course of medication and follow up as previously discussed.  Please also ask her if she has ever been on vaginal estrogen cream.  We can try this for prevention of urinary tract infections prior to starting her back on a daily antibiotic if she would prefer

## 2024-12-03 ENCOUNTER — CLINICAL SUPPORT (OUTPATIENT)
Dept: UROLOGY | Facility: CLINIC | Age: 79
End: 2024-12-03
Payer: MEDICARE

## 2024-12-03 DIAGNOSIS — R30.0 DYSURIA: Primary | ICD-10-CM

## 2024-12-03 LAB
BILIRUBIN, UA POC OHS: NEGATIVE
BLOOD, UA POC OHS: ABNORMAL
CLARITY, UA POC OHS: ABNORMAL
COLOR, UA POC OHS: YELLOW
GLUCOSE, UA POC OHS: NEGATIVE
KETONES, UA POC OHS: NEGATIVE
LEUKOCYTES, UA POC OHS: ABNORMAL
NITRITE, UA POC OHS: POSITIVE
PH, UA POC OHS: 6
PROTEIN, UA POC OHS: 100
SPECIFIC GRAVITY, UA POC OHS: 1.02
UROBILINOGEN, UA POC OHS: 0.2

## 2024-12-03 RX ORDER — AMOXICILLIN AND CLAVULANATE POTASSIUM 875; 125 MG/1; MG/1
1 TABLET, FILM COATED ORAL 2 TIMES DAILY
Qty: 14 TABLET | Refills: 0 | Status: SHIPPED | OUTPATIENT
Start: 2024-12-03 | End: 2024-12-10

## 2024-12-05 ENCOUNTER — TELEPHONE (OUTPATIENT)
Dept: UROLOGY | Facility: CLINIC | Age: 79
End: 2024-12-05
Payer: MEDICARE

## 2024-12-05 LAB — URINE CULTURE, ROUTINE: NORMAL

## 2024-12-05 NOTE — TELEPHONE ENCOUNTER
Pt informed of bacteria and to continue antibiotics until complete.  Acknowledges understanding.----- Message from Brandon Gomez NP sent at 12/5/2024  9:29 AM CST -----  Klebsiella on urine culture.    Continue Augmentin as directed.

## 2024-12-16 RX ORDER — FLUCONAZOLE 150 MG/1
150 TABLET ORAL DAILY
Qty: 1 TABLET | Refills: 0 | Status: SHIPPED | OUTPATIENT
Start: 2024-12-16 | End: 2024-12-17

## 2024-12-16 NOTE — TELEPHONE ENCOUNTER
----- Message from Lin sent at 12/16/2024  9:08 AM CST -----  Contact: self  Type:  Needs Medical Advice    Who Called: Loly Tucker  Symptoms (please be specific): yeast infection   How long has patient had these symptoms:  x 3 days  Pharmacy name and phone #:    [id number] - [facility] [address] [place] [address] & [address] [place] [address]  [address] [place] [id number]  Phone: [phone] Fax: [phone]      Would the patient rather a call back or a response via [facility]?   Best Call Back Number: [phone]  Additional Information: n/a

## 2024-12-27 ENCOUNTER — OFFICE VISIT (OUTPATIENT)
Dept: UROLOGY | Facility: CLINIC | Age: 79
End: 2024-12-27
Payer: MEDICARE

## 2024-12-27 ENCOUNTER — TELEPHONE (OUTPATIENT)
Dept: UROLOGY | Facility: CLINIC | Age: 79
End: 2024-12-27

## 2024-12-27 VITALS
SYSTOLIC BLOOD PRESSURE: 126 MMHG | HEART RATE: 60 BPM | WEIGHT: 167 LBS | DIASTOLIC BLOOD PRESSURE: 78 MMHG | BODY MASS INDEX: 31.53 KG/M2 | HEIGHT: 61 IN | OXYGEN SATURATION: 97 %

## 2024-12-27 DIAGNOSIS — R31.9 URINARY TRACT INFECTION WITH HEMATURIA, SITE UNSPECIFIED: ICD-10-CM

## 2024-12-27 DIAGNOSIS — N39.0 RECURRENT UTI: Primary | ICD-10-CM

## 2024-12-27 DIAGNOSIS — N39.0 URINARY TRACT INFECTION WITH HEMATURIA, SITE UNSPECIFIED: ICD-10-CM

## 2024-12-27 LAB
BILIRUBIN, UA POC OHS: NEGATIVE
BLOOD, UA POC OHS: ABNORMAL
CLARITY, UA POC OHS: ABNORMAL
COLOR, UA POC OHS: YELLOW
GLUCOSE, UA POC OHS: NEGATIVE
KETONES, UA POC OHS: NEGATIVE
LEUKOCYTES, UA POC OHS: ABNORMAL
NITRITE, UA POC OHS: NEGATIVE
PH, UA POC OHS: 6
PROTEIN, UA POC OHS: 100
SPECIFIC GRAVITY, UA POC OHS: 1.01
UROBILINOGEN, UA POC OHS: 0.2

## 2024-12-27 RX ORDER — CEFTRIAXONE 1 G/1
1 INJECTION, POWDER, FOR SOLUTION INTRAMUSCULAR; INTRAVENOUS
Status: COMPLETED | OUTPATIENT
Start: 2024-12-27 | End: 2024-12-27

## 2024-12-27 RX ORDER — PHENAZOPYRIDINE HYDROCHLORIDE 200 MG/1
200 TABLET, FILM COATED ORAL 3 TIMES DAILY PRN
Qty: 15 TABLET | Refills: 0 | Status: SHIPPED | OUTPATIENT
Start: 2024-12-27 | End: 2024-12-27

## 2024-12-27 RX ORDER — HYDRALAZINE HYDROCHLORIDE 50 MG/1
TABLET, FILM COATED ORAL
COMMUNITY

## 2024-12-27 RX ORDER — NITROFURANTOIN 25; 75 MG/1; MG/1
100 CAPSULE ORAL DAILY
Qty: 90 CAPSULE | Refills: 3 | Status: SHIPPED | OUTPATIENT
Start: 2025-01-03 | End: 2026-01-03

## 2024-12-27 RX ORDER — APIXABAN 2.5 MG/1
2.5 TABLET, FILM COATED ORAL 2 TIMES DAILY
COMMUNITY
Start: 2024-10-04

## 2024-12-27 RX ORDER — SULFAMETHOXAZOLE AND TRIMETHOPRIM 800; 160 MG/1; MG/1
1 TABLET ORAL 2 TIMES DAILY
Qty: 14 TABLET | Refills: 0 | Status: SHIPPED | OUTPATIENT
Start: 2024-12-27 | End: 2025-01-03

## 2024-12-27 RX ADMIN — CEFTRIAXONE 1 G: 1 INJECTION, POWDER, FOR SOLUTION INTRAMUSCULAR; INTRAVENOUS at 09:12

## 2024-12-27 NOTE — TELEPHONE ENCOUNTER
Contacted pt in regards to next appt scheduled 06/27/24. Pt verbalizes she left clinic a little early and is feeling fine after the initial rocephin IM. Encouraged to contact clinic with any questions or concerns. Verbalized understanding.

## 2024-12-27 NOTE — PROGRESS NOTES
SN administered rocephin IM 1g injection using aseptic technique to left hip. Patient tolerated procedures well without complications noted.

## 2024-12-27 NOTE — PROGRESS NOTES
"Subjective:       Patient ID: Loly Tucker is a 79 y.o. female.    Chief Complaint: No chief complaint on file.      HPI: 79-year-old female patient presenting to the clinic with a complaint of urinary tract infection.  She was previously on daily Macrobid 100 mg however she states she has not been taking this medication.  She has had multiple urinary tract infections lately.  She complains of dysuria frequency and urgency.         Past Medical History:   Past Medical History:   Diagnosis Date    Acid reflux     Allergy     Arthritis     Cataract     Diabetes mellitus     Disorder of kidney and ureter     Dry eyes     Dry mouth     Heart attack     Hypertension     Stroke        Past Surgical Historical:   Past Surgical History:   Procedure Laterality Date    EYE SURGERY      FRACTURE SURGERY      HYSTERECTOMY      INSERTION OF PACEMAKER      JOINT REPLACEMENT      KNEE SURGERY Bilateral         Medications:   Medication List with Changes/Refills   New Medications    NITROFURANTOIN, MACROCRYSTAL-MONOHYDRATE, (MACROBID) 100 MG CAPSULE    Take 1 capsule (100 mg total) by mouth Daily.    PHENAZOPYRIDINE (PYRIDIUM) 200 MG TABLET    Take 1 tablet (200 mg total) by mouth 3 (three) times daily as needed for Pain.    SULFAMETHOXAZOLE-TRIMETHOPRIM 800-160MG (BACTRIM DS) 800-160 MG TAB    Take 1 tablet by mouth 2 (two) times daily. for 7 days   Current Medications    AMLODIPINE (NORVASC) 10 MG TABLET    Take 10 mg by mouth.    BD WALTER 2ND GEN PEN NEEDLE 32 GAUGE X 5/32" NDLE        BD ULTRA-FINE SHORT PEN NEEDLE 31 GAUGE X 5/16" NDLE    USE ONCE AT BEDTIME IN CONJUNCTION WITH INSULIN PEN.    CALCIUM CARBONATE (OS-HOANG) 500 MG CALCIUM (1,250 MG) CHEWABLE TABLET    Take by mouth.    DENOSUMAB (PROLIA) 60 MG/ML SYRG    Inject into the skin.    ELIQUIS 2.5 MG TAB    Take 2.5 mg by mouth 2 (two) times daily.    FOLIC ACID (FOLVITE) 1 MG TABLET    Take 1 tablet (1,000 mcg total) by mouth once daily.    HYDRALAZINE (APRESOLINE) " 50 MG TABLET    TAKE 1 AND 1/2 TABLETS BY MOUTH THREE TIMES DAILY    HYDROCODONE-ACETAMINOPHEN (NORCO)  MG PER TABLET    Take 1 tablet by mouth every 4 (four) hours as needed.    INSULIN (LANTUS SOLOSTAR U-100 INSULIN) GLARGINE 100 UNITS/ML (3ML) SUBQ PEN    Lantus Solostar U-100 Insulin 100 unit/mL (3 mL) subcutaneous pen   INJECT 14 UNITS SC HS    METOPROLOL SUCCINATE (TOPROL-XL) 25 MG 24 HR TABLET    TAKE 1 TABLET BY MOUTH DAILY IN THE MORNING    MULTIVITAMIN-MINERALS-LUTEIN (MULTIVITAMIN 50 PLUS) TAB    Multivitamin 50 Plus tablet   Take 1 tablet every day by oral route.    RITUXIMAB (RITUXAN IV)    Inject into the vein.    ROSUVASTATIN (CRESTOR) 10 MG TABLET    Take 10 mg by mouth.    TEMAZEPAM (RESTORIL) 30 MG CAPSULE    Take 1 capsule by mouth every evening.    TRADJENTA 5 MG TAB TABLET    TK 1 T PO D        Past Social History:   Social History     Socioeconomic History    Marital status:    Tobacco Use    Smoking status: Never    Smokeless tobacco: Never   Substance and Sexual Activity    Alcohol use: No    Drug use: Yes     Types: Hydrocodone       Allergies:   Review of patient's allergies indicates:   Allergen Reactions    Nsaids (non-steroidal anti-inflammatory drug) Other (See Comments)     Chronic kidney disease        Family History:   Family History   Problem Relation Name Age of Onset    Cancer Mother      Hypertension Mother      Heart failure Mother      Cancer Father      Rheum arthritis Father          Review of Systems:  Review of Systems   Constitutional:  Negative for activity change, appetite change, chills, diaphoresis, fatigue, fever and unexpected weight change.   HENT:  Negative for congestion, dental problem, drooling, ear discharge, ear pain, facial swelling, hearing loss, mouth sores, nosebleeds, postnasal drip, rhinorrhea, sinus pressure, sinus pain, sneezing, sore throat, tinnitus, trouble swallowing and voice change.    Eyes:  Negative for photophobia, pain,  discharge, redness, itching and visual disturbance.   Respiratory:  Negative for apnea, cough, choking, chest tightness, shortness of breath, wheezing and stridor.    Cardiovascular:  Negative for chest pain and leg swelling.   Gastrointestinal:  Negative for abdominal distention, abdominal pain, anal bleeding, blood in stool, constipation, diarrhea, nausea, rectal pain and vomiting.   Endocrine: Negative for cold intolerance, heat intolerance, polydipsia, polyphagia and polyuria.   Genitourinary:  Positive for dysuria, frequency and urgency. Negative for decreased urine volume, difficulty urinating, dyspareunia, enuresis, flank pain, genital sores, hematuria, menstrual problem, pelvic pain, vaginal bleeding, vaginal discharge and vaginal pain.   Musculoskeletal:  Negative for arthralgias, back pain, gait problem, joint swelling, myalgias, neck pain and neck stiffness.   Skin:  Negative for color change, pallor, rash and wound.   Allergic/Immunologic: Negative for environmental allergies, food allergies and immunocompromised state.   Neurological:  Negative for dizziness, tremors, seizures, syncope, facial asymmetry, speech difficulty, weakness, light-headedness, numbness and headaches.   Hematological:  Negative for adenopathy. Does not bruise/bleed easily.   Psychiatric/Behavioral:  Negative for agitation, behavioral problems, confusion, decreased concentration, dysphoric mood, hallucinations, self-injury, sleep disturbance and suicidal ideas. The patient is not nervous/anxious and is not hyperactive.        Physical Exam:  Physical Exam  Exam conducted with a chaperone present.   Constitutional:       Appearance: Normal appearance.   HENT:      Head: Normocephalic.      Nose: Nose normal.      Mouth/Throat:      Mouth: Mucous membranes are moist.      Pharynx: Oropharynx is clear.   Eyes:      Extraocular Movements: Extraocular movements intact.      Conjunctiva/sclera: Conjunctivae normal.      Pupils: Pupils  are equal, round, and reactive to light.   Cardiovascular:      Rate and Rhythm: Normal rate and regular rhythm.      Pulses: Normal pulses.      Heart sounds: Normal heart sounds.   Pulmonary:      Effort: Pulmonary effort is normal.      Breath sounds: Normal breath sounds.   Abdominal:      General: Abdomen is flat. Bowel sounds are normal.      Palpations: Abdomen is soft.      Hernia: There is no hernia in the left inguinal area or right inguinal area.   Genitourinary:     General: Normal vulva.      Pubic Area: No rash or pubic lice.       Labia:         Right: No rash, tenderness, lesion or injury.         Left: No rash, tenderness, lesion or injury.       Urethra: No prolapse, urethral pain, urethral swelling or urethral lesion.      Rectum: Normal.   Musculoskeletal:         General: Normal range of motion.      Cervical back: Normal range of motion and neck supple.   Lymphadenopathy:      Lower Body: No right inguinal adenopathy. No left inguinal adenopathy.   Skin:     General: Skin is warm and dry.      Capillary Refill: Capillary refill takes less than 2 seconds.   Neurological:      General: No focal deficit present.      Mental Status: She is alert and oriented to person, place, and time. Mental status is at baseline.   Psychiatric:         Mood and Affect: Mood normal.         Behavior: Behavior normal.         Thought Content: Thought content normal.         Judgment: Judgment normal.         Assessment/Plan:     Recurrent UTI: Following treatment of current infection we will restart the patient on Macrobid 100 mg once daily.  Urinalysis positive for infection today.  We will start the patient on Bactrim and administered 1 g of Rocephin IM in clinic.  Complete urine drop-off with our office if symptoms of infection develop in the future.  We will have the patient follow up in 6 months.  Problem List Items Addressed This Visit    None  Visit Diagnoses       Recurrent UTI    -  Primary    Relevant  Medications    nitrofurantoin, macrocrystal-monohydrate, (MACROBID) 100 MG capsule (Start on 1/3/2025)    Other Relevant Orders    POCT Urinalysis(Instrument) (Completed)    Urinary tract infection with hematuria, site unspecified        Relevant Medications    sulfamethoxazole-trimethoprim 800-160mg (BACTRIM DS) 800-160 mg Tab    phenazopyridine (PYRIDIUM) 200 MG tablet    Other Relevant Orders    Urine culture

## 2024-12-28 LAB — URINE CULTURE, ROUTINE: NORMAL

## 2024-12-30 ENCOUNTER — DOCUMENTATION ONLY (OUTPATIENT)
Dept: UROLOGY | Facility: CLINIC | Age: 79
End: 2024-12-30
Payer: MEDICARE

## 2024-12-30 DIAGNOSIS — N39.0 RECURRENT UTI: Primary | ICD-10-CM

## 2024-12-30 RX ORDER — PHENAZOPYRIDINE HYDROCHLORIDE 200 MG/1
200 TABLET, FILM COATED ORAL 3 TIMES DAILY PRN
Qty: 9 TABLET | Refills: 0 | Status: SHIPPED | OUTPATIENT
Start: 2024-12-30 | End: 2025-01-02

## 2024-12-30 NOTE — PROGRESS NOTES
[facility] faxed over a medication on pyridium. Medication was routed to [facility] to be filled.

## 2025-01-03 ENCOUNTER — TELEPHONE (OUTPATIENT)
Dept: UROLOGY | Facility: CLINIC | Age: 80
End: 2025-01-03
Payer: MEDICARE

## 2025-01-03 NOTE — TELEPHONE ENCOUNTER
"Informed pt of urine culture results and to complete course of appropriate abx that was prescribed initially for this infection. Verbalized she "finished the 14 pills two days ago and started back on her daily Macrobid." Encouraged her to report back to the clinic for a U/A drop off in the future if she has any UTI s/s. Verbalized understanding.    ----- Message from Jose C Haynes MD sent at 1/2/2025 10:43 AM CST -----  Please notify patient she is on the appropriate antibiotic based on urine culture results.  Complete full course of Bactrim  "

## 2025-02-05 ENCOUNTER — TELEPHONE (OUTPATIENT)
Dept: UROLOGY | Facility: CLINIC | Age: 80
End: 2025-02-05
Payer: MEDICARE

## 2025-02-05 DIAGNOSIS — N39.0 RECURRENT UTI: ICD-10-CM

## 2025-02-05 DIAGNOSIS — B37.9 YEAST INFECTION: Primary | ICD-10-CM

## 2025-02-05 RX ORDER — FLUCONAZOLE 150 MG/1
150 TABLET ORAL DAILY
Qty: 2 TABLET | Refills: 0 | Status: SHIPPED | OUTPATIENT
Start: 2025-02-05 | End: 2025-02-07

## 2025-03-10 DIAGNOSIS — N39.0 RECURRENT UTI: Primary | ICD-10-CM

## 2025-03-10 RX ORDER — NITROFURANTOIN 25; 75 MG/1; MG/1
100 CAPSULE ORAL DAILY
Qty: 90 CAPSULE | Refills: 3 | Status: SHIPPED | OUTPATIENT
Start: 2025-03-10 | End: 2025-03-12 | Stop reason: SDUPTHER

## 2025-03-12 DIAGNOSIS — N39.0 RECURRENT UTI: Primary | ICD-10-CM

## 2025-03-13 RX ORDER — NITROFURANTOIN 25; 75 MG/1; MG/1
100 CAPSULE ORAL DAILY
Qty: 90 CAPSULE | Refills: 3 | Status: SHIPPED | OUTPATIENT
Start: 2025-03-13 | End: 2026-03-13

## 2025-03-19 DIAGNOSIS — N39.0 RECURRENT UTI: Primary | ICD-10-CM

## 2025-03-19 RX ORDER — NITROFURANTOIN 25; 75 MG/1; MG/1
100 CAPSULE ORAL DAILY
Qty: 90 CAPSULE | Refills: 3 | Status: SHIPPED | OUTPATIENT
Start: 2025-03-19 | End: 2026-03-19

## 2025-03-20 ENCOUNTER — TELEPHONE (OUTPATIENT)
Dept: PAIN MEDICINE | Facility: CLINIC | Age: 80
End: 2025-03-20
Payer: MEDICARE

## 2025-04-21 ENCOUNTER — TELEPHONE (OUTPATIENT)
Dept: PAIN MEDICINE | Facility: CLINIC | Age: 80
End: 2025-04-21
Payer: MEDICARE

## 2025-04-21 NOTE — TELEPHONE ENCOUNTER
----- Message from Sakina sent at 4/21/2025 11:16 AM CDT -----  Contact: SHAZIA MALIN [[id number]]  ..Type:  Patient Requesting CallWho Called: SHAZIA MALIN [[id number]]What is the call regarding?: pt is calling to check on her referral to the office and schedule an appt.Would the patient rather a call back or a response via [facility]?  [facility] Call Back Number: [phone] (home) Additional Information:

## 2025-04-28 ENCOUNTER — TELEPHONE (OUTPATIENT)
Dept: PAIN MEDICINE | Facility: CLINIC | Age: 80
End: 2025-04-28
Payer: MEDICARE

## 2025-04-28 NOTE — TELEPHONE ENCOUNTER
----- Message from Bety sent at 4/28/2025  8:57 AM CDT -----  Patient is calling in regards to referral for please call her back at [phone] (home)

## 2025-04-29 DIAGNOSIS — M06.9 RHEUMATOID ARTHRITIS: ICD-10-CM

## 2025-04-29 DIAGNOSIS — G89.29 CHRONIC PAIN: Primary | ICD-10-CM

## 2025-05-01 ENCOUNTER — TELEPHONE (OUTPATIENT)
Dept: PAIN MEDICINE | Facility: CLINIC | Age: 80
End: 2025-05-01
Payer: MEDICARE

## 2025-05-01 NOTE — TELEPHONE ENCOUNTER
----- Message from Kaya sent at 5/1/2025  9:45 AM CDT -----  Contact: pt  Type:  Patient Returning CallWho Called:ptWho Left Message for Patient:EdenDoes the patient know what this is regarding?:missed callWould the patient rather a call back or a response via [facility]? Call [facility] Call Back Number:[phone] Additional Information: none

## 2025-05-15 ENCOUNTER — TELEPHONE (OUTPATIENT)
Dept: PAIN MEDICINE | Facility: CLINIC | Age: 80
End: 2025-05-15
Payer: MEDICARE

## 2025-05-15 NOTE — TELEPHONE ENCOUNTER
----- Message from Kaya sent at 5/15/2025 10:23 AM CDT -----  Contact: pt  Pt calling to cancel and does not want to reschedule appt and can be reached at [phone].